# Patient Record
Sex: MALE | Race: WHITE | Employment: UNEMPLOYED | URBAN - METROPOLITAN AREA
[De-identification: names, ages, dates, MRNs, and addresses within clinical notes are randomized per-mention and may not be internally consistent; named-entity substitution may affect disease eponyms.]

---

## 2019-07-15 ENCOUNTER — APPOINTMENT (OUTPATIENT)
Dept: GENERAL RADIOLOGY | Age: 54
DRG: 351 | End: 2019-07-15
Payer: COMMERCIAL

## 2019-07-15 ENCOUNTER — HOSPITAL ENCOUNTER (INPATIENT)
Age: 54
LOS: 6 days | Discharge: NURSING, REHAB OR LONG-TERM CARE | DRG: 351 | End: 2019-07-22
Attending: EMERGENCY MEDICINE | Admitting: INTERNAL MEDICINE
Payer: COMMERCIAL

## 2019-07-15 DIAGNOSIS — T79.5XXA: Primary | ICD-10-CM

## 2019-07-15 DIAGNOSIS — T79.A21A TRAUMATIC COMPARTMENT SYNDROME OF RIGHT LOWER EXTREMITY, INITIAL ENCOUNTER (HCC): ICD-10-CM

## 2019-07-15 DIAGNOSIS — S89.91XA RIGHT LEG INJURY, INITIAL ENCOUNTER: ICD-10-CM

## 2019-07-15 LAB
ANION GAP SERPL CALCULATED.3IONS-SCNC: 14 MMOL/L (ref 3–16)
BUN BLDV-MCNC: 13 MG/DL (ref 7–20)
CALCIUM SERPL-MCNC: 9.1 MG/DL (ref 8.3–10.6)
CHLORIDE BLD-SCNC: 98 MMOL/L (ref 99–110)
CO2: 28 MMOL/L (ref 21–32)
CREAT SERPL-MCNC: 1.1 MG/DL (ref 0.9–1.3)
GFR AFRICAN AMERICAN: >60
GFR NON-AFRICAN AMERICAN: >60
GLUCOSE BLD-MCNC: 132 MG/DL (ref 70–99)
POTASSIUM REFLEX MAGNESIUM: 3.8 MMOL/L (ref 3.5–5.1)
SODIUM BLD-SCNC: 140 MMOL/L (ref 136–145)
TOTAL CK: ABNORMAL U/L (ref 39–308)

## 2019-07-15 PROCEDURE — 4500000026 HC ED CRITICAL CARE PROCEDURE

## 2019-07-15 PROCEDURE — 99291 CRITICAL CARE FIRST HOUR: CPT

## 2019-07-15 PROCEDURE — 82550 ASSAY OF CK (CPK): CPT

## 2019-07-15 PROCEDURE — 73552 X-RAY EXAM OF FEMUR 2/>: CPT

## 2019-07-15 PROCEDURE — 80048 BASIC METABOLIC PNL TOTAL CA: CPT

## 2019-07-15 PROCEDURE — 73590 X-RAY EXAM OF LOWER LEG: CPT

## 2019-07-15 RX ORDER — LIDOCAINE HYDROCHLORIDE AND EPINEPHRINE 10; 10 MG/ML; UG/ML
20 INJECTION, SOLUTION INFILTRATION; PERINEURAL ONCE
Status: COMPLETED | OUTPATIENT
Start: 2019-07-15 | End: 2019-07-16

## 2019-07-15 RX ORDER — LIDOCAINE HYDROCHLORIDE AND EPINEPHRINE 10; 10 MG/ML; UG/ML
INJECTION, SOLUTION INFILTRATION; PERINEURAL
Status: COMPLETED
Start: 2019-07-15 | End: 2019-07-16

## 2019-07-15 RX ORDER — DIVALPROEX SODIUM 125 MG/1
125 TABLET, DELAYED RELEASE ORAL 3 TIMES DAILY
COMMUNITY

## 2019-07-15 SDOH — HEALTH STABILITY: MENTAL HEALTH: HOW OFTEN DO YOU HAVE A DRINK CONTAINING ALCOHOL?: NEVER

## 2019-07-15 ASSESSMENT — PAIN SCALES - GENERAL: PAINLEVEL_OUTOF10: 10

## 2019-07-15 ASSESSMENT — PAIN DESCRIPTION - LOCATION: LOCATION: LEG

## 2019-07-15 ASSESSMENT — ENCOUNTER SYMPTOMS
SHORTNESS OF BREATH: 0
DIARRHEA: 0
ABDOMINAL PAIN: 1
VOMITING: 0
NAUSEA: 1

## 2019-07-15 ASSESSMENT — PAIN DESCRIPTION - ORIENTATION: ORIENTATION: RIGHT

## 2019-07-15 ASSESSMENT — PAIN DESCRIPTION - PAIN TYPE: TYPE: ACUTE PAIN

## 2019-07-15 NOTE — ED TRIAGE NOTES
Pt. States that he was lying in bed 2 days ago and was bitten on the back of his L upper leg, he now has blisters forming on the lower part of his leg and has some weakness in the leg.

## 2019-07-16 PROBLEM — S89.91XA RIGHT LEG INJURY, INITIAL ENCOUNTER: Status: ACTIVE | Noted: 2019-07-16

## 2019-07-16 PROBLEM — S89.92XA LEFT LEG INJURY, INITIAL ENCOUNTER: Status: ACTIVE | Noted: 2019-07-16

## 2019-07-16 LAB
A/G RATIO: 1.6 (ref 1.1–2.2)
ALBUMIN SERPL-MCNC: 3.6 G/DL (ref 3.4–5)
ALBUMIN SERPL-MCNC: 3.9 G/DL (ref 3.4–5)
ALP BLD-CCNC: 43 U/L (ref 40–129)
ALT SERPL-CCNC: 93 U/L (ref 10–40)
AMORPHOUS: ABNORMAL /HPF
AMPHETAMINE SCREEN, URINE: POSITIVE
ANION GAP SERPL CALCULATED.3IONS-SCNC: 10 MMOL/L (ref 3–16)
ANION GAP SERPL CALCULATED.3IONS-SCNC: 11 MMOL/L (ref 3–16)
AST SERPL-CCNC: 381 U/L (ref 15–37)
BACTERIA: ABNORMAL /HPF
BARBITURATE SCREEN URINE: ABNORMAL
BASOPHILS ABSOLUTE: 0 K/UL (ref 0–0.2)
BASOPHILS RELATIVE PERCENT: 0.4 %
BENZODIAZEPINE SCREEN, URINE: ABNORMAL
BILIRUB SERPL-MCNC: 0.9 MG/DL (ref 0–1)
BILIRUBIN URINE: NEGATIVE
BLOOD, URINE: ABNORMAL
BUN BLDV-MCNC: 12 MG/DL (ref 7–20)
BUN BLDV-MCNC: 13 MG/DL (ref 7–20)
CALCIUM SERPL-MCNC: 7.8 MG/DL (ref 8.3–10.6)
CALCIUM SERPL-MCNC: 8.2 MG/DL (ref 8.3–10.6)
CANNABINOID SCREEN URINE: POSITIVE
CHLORIDE BLD-SCNC: 100 MMOL/L (ref 99–110)
CHLORIDE BLD-SCNC: 104 MMOL/L (ref 99–110)
CLARITY: ABNORMAL
CO2: 26 MMOL/L (ref 21–32)
CO2: 27 MMOL/L (ref 21–32)
COCAINE METABOLITE SCREEN URINE: ABNORMAL
COLOR: YELLOW
CREAT SERPL-MCNC: 0.9 MG/DL (ref 0.9–1.3)
CREAT SERPL-MCNC: 1 MG/DL (ref 0.9–1.3)
EKG ATRIAL RATE: 70 BPM
EKG DIAGNOSIS: NORMAL
EKG P AXIS: 49 DEGREES
EKG P-R INTERVAL: 126 MS
EKG Q-T INTERVAL: 434 MS
EKG QRS DURATION: 94 MS
EKG QTC CALCULATION (BAZETT): 468 MS
EKG R AXIS: 70 DEGREES
EKG T AXIS: 70 DEGREES
EKG VENTRICULAR RATE: 70 BPM
EOSINOPHILS ABSOLUTE: 0 K/UL (ref 0–0.6)
EOSINOPHILS RELATIVE PERCENT: 0.2 %
GFR AFRICAN AMERICAN: >60
GFR AFRICAN AMERICAN: >60
GFR NON-AFRICAN AMERICAN: >60
GFR NON-AFRICAN AMERICAN: >60
GLOBULIN: 2.4 G/DL
GLUCOSE BLD-MCNC: 111 MG/DL (ref 70–99)
GLUCOSE BLD-MCNC: 155 MG/DL (ref 70–99)
GLUCOSE URINE: NEGATIVE MG/DL
HCT VFR BLD CALC: 39.4 % (ref 40.5–52.5)
HEMOGLOBIN: 13.2 G/DL (ref 13.5–17.5)
KETONES, URINE: NEGATIVE MG/DL
LEUKOCYTE ESTERASE, URINE: NEGATIVE
LV EF: 53 %
LVEF MODALITY: NORMAL
LYMPHOCYTES ABSOLUTE: 1.8 K/UL (ref 1–5.1)
LYMPHOCYTES RELATIVE PERCENT: 21.9 %
Lab: ABNORMAL
MAGNESIUM: 2 MG/DL (ref 1.8–2.4)
MCH RBC QN AUTO: 28.6 PG (ref 26–34)
MCHC RBC AUTO-ENTMCNC: 33.4 G/DL (ref 31–36)
MCV RBC AUTO: 85.8 FL (ref 80–100)
METHADONE SCREEN, URINE: ABNORMAL
MICROSCOPIC EXAMINATION: YES
MONOCYTES ABSOLUTE: 1 K/UL (ref 0–1.3)
MONOCYTES RELATIVE PERCENT: 12.6 %
NEUTROPHILS ABSOLUTE: 5.2 K/UL (ref 1.7–7.7)
NEUTROPHILS RELATIVE PERCENT: 64.9 %
NITRITE, URINE: NEGATIVE
OPIATE SCREEN URINE: ABNORMAL
OXYCODONE URINE: ABNORMAL
PDW BLD-RTO: 13.5 % (ref 12.4–15.4)
PH UA: 6
PH UA: 6 (ref 5–8)
PHENCYCLIDINE SCREEN URINE: ABNORMAL
PHOSPHORUS: 3.6 MG/DL (ref 2.5–4.9)
PLATELET # BLD: 117 K/UL (ref 135–450)
PMV BLD AUTO: 7.4 FL (ref 5–10.5)
POTASSIUM REFLEX MAGNESIUM: 3.3 MMOL/L (ref 3.5–5.1)
POTASSIUM SERPL-SCNC: 3.7 MMOL/L (ref 3.5–5.1)
PROPOXYPHENE SCREEN: ABNORMAL
PROTEIN UA: 30 MG/DL
RBC # BLD: 4.59 M/UL (ref 4.2–5.9)
RBC UA: ABNORMAL /HPF (ref 0–2)
SODIUM BLD-SCNC: 138 MMOL/L (ref 136–145)
SODIUM BLD-SCNC: 140 MMOL/L (ref 136–145)
SPECIFIC GRAVITY UA: >=1.03 (ref 1–1.03)
T4 FREE: 1.4 NG/DL (ref 0.9–1.8)
TOTAL CK: ABNORMAL U/L (ref 39–308)
TOTAL CK: ABNORMAL U/L (ref 39–308)
TOTAL PROTEIN: 6.3 G/DL (ref 6.4–8.2)
TSH REFLEX: 4.76 UIU/ML (ref 0.27–4.2)
URINE REFLEX TO CULTURE: ABNORMAL
URINE TYPE: ABNORMAL
UROBILINOGEN, URINE: 0.2 E.U./DL
WBC # BLD: 8.1 K/UL (ref 4–11)
WBC UA: ABNORMAL /HPF (ref 0–5)

## 2019-07-16 PROCEDURE — 81001 URINALYSIS AUTO W/SCOPE: CPT

## 2019-07-16 PROCEDURE — 82550 ASSAY OF CK (CPK): CPT

## 2019-07-16 PROCEDURE — 36415 COLL VENOUS BLD VENIPUNCTURE: CPT

## 2019-07-16 PROCEDURE — 2580000003 HC RX 258: Performed by: STUDENT IN AN ORGANIZED HEALTH CARE EDUCATION/TRAINING PROGRAM

## 2019-07-16 PROCEDURE — 1200000000 HC SEMI PRIVATE

## 2019-07-16 PROCEDURE — 2500000003 HC RX 250 WO HCPCS

## 2019-07-16 PROCEDURE — 93306 TTE W/DOPPLER COMPLETE: CPT

## 2019-07-16 PROCEDURE — 84439 ASSAY OF FREE THYROXINE: CPT

## 2019-07-16 PROCEDURE — 2580000003 HC RX 258: Performed by: EMERGENCY MEDICINE

## 2019-07-16 PROCEDURE — 93010 ELECTROCARDIOGRAM REPORT: CPT | Performed by: INTERNAL MEDICINE

## 2019-07-16 PROCEDURE — 96360 HYDRATION IV INFUSION INIT: CPT

## 2019-07-16 PROCEDURE — 84443 ASSAY THYROID STIM HORMONE: CPT

## 2019-07-16 PROCEDURE — 93005 ELECTROCARDIOGRAM TRACING: CPT | Performed by: STUDENT IN AN ORGANIZED HEALTH CARE EDUCATION/TRAINING PROGRAM

## 2019-07-16 PROCEDURE — 85025 COMPLETE CBC W/AUTO DIFF WBC: CPT

## 2019-07-16 PROCEDURE — 6370000000 HC RX 637 (ALT 250 FOR IP): Performed by: STUDENT IN AN ORGANIZED HEALTH CARE EDUCATION/TRAINING PROGRAM

## 2019-07-16 PROCEDURE — 6360000002 HC RX W HCPCS: Performed by: STUDENT IN AN ORGANIZED HEALTH CARE EDUCATION/TRAINING PROGRAM

## 2019-07-16 PROCEDURE — 83735 ASSAY OF MAGNESIUM: CPT

## 2019-07-16 PROCEDURE — 80307 DRUG TEST PRSMV CHEM ANLYZR: CPT

## 2019-07-16 PROCEDURE — 80053 COMPREHEN METABOLIC PANEL: CPT

## 2019-07-16 PROCEDURE — 93005 ELECTROCARDIOGRAM TRACING: CPT

## 2019-07-16 PROCEDURE — 2580000003 HC RX 258

## 2019-07-16 RX ORDER — SODIUM CHLORIDE 9 MG/ML
INJECTION, SOLUTION INTRAVENOUS
Status: COMPLETED
Start: 2019-07-16 | End: 2019-07-16

## 2019-07-16 RX ORDER — ACETAMINOPHEN 325 MG/1
650 TABLET ORAL EVERY 4 HOURS PRN
Status: DISCONTINUED | OUTPATIENT
Start: 2019-07-16 | End: 2019-07-17

## 2019-07-16 RX ORDER — ONDANSETRON 2 MG/ML
4 INJECTION INTRAMUSCULAR; INTRAVENOUS EVERY 6 HOURS PRN
Status: DISCONTINUED | OUTPATIENT
Start: 2019-07-16 | End: 2019-07-22 | Stop reason: HOSPADM

## 2019-07-16 RX ORDER — TRAMADOL HYDROCHLORIDE 50 MG/1
25 TABLET ORAL EVERY 6 HOURS PRN
Status: DISCONTINUED | OUTPATIENT
Start: 2019-07-16 | End: 2019-07-17

## 2019-07-16 RX ORDER — POTASSIUM CHLORIDE 1.5 G/1.77G
40 POWDER, FOR SOLUTION ORAL ONCE
Status: COMPLETED | OUTPATIENT
Start: 2019-07-16 | End: 2019-07-16

## 2019-07-16 RX ORDER — SODIUM CHLORIDE 0.9 % (FLUSH) 0.9 %
10 SYRINGE (ML) INJECTION EVERY 12 HOURS SCHEDULED
Status: DISCONTINUED | OUTPATIENT
Start: 2019-07-16 | End: 2019-07-22 | Stop reason: HOSPADM

## 2019-07-16 RX ORDER — 0.9 % SODIUM CHLORIDE 0.9 %
1000 INTRAVENOUS SOLUTION INTRAVENOUS ONCE
Status: COMPLETED | OUTPATIENT
Start: 2019-07-16 | End: 2019-07-16

## 2019-07-16 RX ORDER — SODIUM CHLORIDE 0.9 % (FLUSH) 0.9 %
10 SYRINGE (ML) INJECTION PRN
Status: DISCONTINUED | OUTPATIENT
Start: 2019-07-16 | End: 2019-07-22 | Stop reason: HOSPADM

## 2019-07-16 RX ORDER — DIVALPROEX SODIUM 125 MG/1
125 TABLET, DELAYED RELEASE ORAL 3 TIMES DAILY
Status: DISCONTINUED | OUTPATIENT
Start: 2019-07-16 | End: 2019-07-22 | Stop reason: HOSPADM

## 2019-07-16 RX ORDER — SODIUM CHLORIDE 9 MG/ML
INJECTION, SOLUTION INTRAVENOUS CONTINUOUS
Status: DISCONTINUED | OUTPATIENT
Start: 2019-07-16 | End: 2019-07-20

## 2019-07-16 RX ADMIN — SODIUM CHLORIDE: 9 INJECTION, SOLUTION INTRAVENOUS at 08:32

## 2019-07-16 RX ADMIN — Medication 10 ML: at 20:50

## 2019-07-16 RX ADMIN — POTASSIUM CHLORIDE 40 MEQ: 1.5 POWDER, FOR SOLUTION ORAL at 08:32

## 2019-07-16 RX ADMIN — ENOXAPARIN SODIUM 30 MG: 30 INJECTION SUBCUTANEOUS at 20:50

## 2019-07-16 RX ADMIN — DIVALPROEX SODIUM 125 MG: 125 TABLET, DELAYED RELEASE ORAL at 23:07

## 2019-07-16 RX ADMIN — LIDOCAINE HYDROCHLORIDE,EPINEPHRINE BITARTRATE 20 ML: 10; .01 INJECTION, SOLUTION INFILTRATION; PERINEURAL at 00:00

## 2019-07-16 RX ADMIN — DIVALPROEX SODIUM 125 MG: 125 TABLET, DELAYED RELEASE ORAL at 08:34

## 2019-07-16 RX ADMIN — SODIUM CHLORIDE: 9 INJECTION, SOLUTION INTRAVENOUS at 13:20

## 2019-07-16 RX ADMIN — SODIUM CHLORIDE: 9 INJECTION, SOLUTION INTRAVENOUS at 20:50

## 2019-07-16 RX ADMIN — SODIUM CHLORIDE: 9 INJECTION, SOLUTION INTRAVENOUS at 17:38

## 2019-07-16 RX ADMIN — LIDOCAINE HYDROCHLORIDE AND EPINEPHRINE 20 ML: 10; 10 INJECTION, SOLUTION INFILTRATION; PERINEURAL at 00:00

## 2019-07-16 RX ADMIN — SODIUM CHLORIDE: 9 INJECTION, SOLUTION INTRAVENOUS at 04:13

## 2019-07-16 RX ADMIN — TRAMADOL HYDROCHLORIDE 25 MG: 50 TABLET, FILM COATED ORAL at 04:14

## 2019-07-16 RX ADMIN — ENOXAPARIN SODIUM 30 MG: 30 INJECTION SUBCUTANEOUS at 08:32

## 2019-07-16 RX ADMIN — SODIUM CHLORIDE 1000 ML: 9 INJECTION, SOLUTION INTRAVENOUS at 01:38

## 2019-07-16 RX ADMIN — DIVALPROEX SODIUM 125 MG: 125 TABLET, DELAYED RELEASE ORAL at 16:55

## 2019-07-16 RX ADMIN — TRAMADOL HYDROCHLORIDE 25 MG: 50 TABLET, FILM COATED ORAL at 10:24

## 2019-07-16 RX ADMIN — TRAMADOL HYDROCHLORIDE 25 MG: 50 TABLET, FILM COATED ORAL at 16:56

## 2019-07-16 RX ADMIN — TRAMADOL HYDROCHLORIDE 25 MG: 50 TABLET, FILM COATED ORAL at 23:15

## 2019-07-16 ASSESSMENT — ENCOUNTER SYMPTOMS
RESPIRATORY NEGATIVE: 1
CHEST TIGHTNESS: 0
VOMITING: 0
ABDOMINAL DISTENTION: 0
STRIDOR: 0
COLOR CHANGE: 1
NAUSEA: 0
COUGH: 0
CONSTIPATION: 0
BACK PAIN: 0
ABDOMINAL PAIN: 1
SHORTNESS OF BREATH: 0
WHEEZING: 0
DIARRHEA: 1

## 2019-07-16 ASSESSMENT — PAIN DESCRIPTION - DESCRIPTORS
DESCRIPTORS: BURNING;ACHING
DESCRIPTORS: ACHING;THROBBING
DESCRIPTORS: ACHING

## 2019-07-16 ASSESSMENT — PAIN DESCRIPTION - PAIN TYPE
TYPE: ACUTE PAIN

## 2019-07-16 ASSESSMENT — PAIN DESCRIPTION - LOCATION
LOCATION: LEG

## 2019-07-16 ASSESSMENT — PAIN DESCRIPTION - PROGRESSION
CLINICAL_PROGRESSION: GRADUALLY WORSENING
CLINICAL_PROGRESSION: GRADUALLY WORSENING
CLINICAL_PROGRESSION: NOT CHANGED

## 2019-07-16 ASSESSMENT — PAIN - FUNCTIONAL ASSESSMENT
PAIN_FUNCTIONAL_ASSESSMENT: PREVENTS OR INTERFERES SOME ACTIVE ACTIVITIES AND ADLS

## 2019-07-16 ASSESSMENT — PAIN SCALES - GENERAL
PAINLEVEL_OUTOF10: 10
PAINLEVEL_OUTOF10: 7
PAINLEVEL_OUTOF10: 5
PAINLEVEL_OUTOF10: 7
PAINLEVEL_OUTOF10: 4
PAINLEVEL_OUTOF10: 7
PAINLEVEL_OUTOF10: 4
PAINLEVEL_OUTOF10: 10
PAINLEVEL_OUTOF10: 5
PAINLEVEL_OUTOF10: 0

## 2019-07-16 ASSESSMENT — PAIN DESCRIPTION - ORIENTATION
ORIENTATION: RIGHT

## 2019-07-16 ASSESSMENT — PAIN DESCRIPTION - ONSET
ONSET: ON-GOING

## 2019-07-16 ASSESSMENT — PAIN DESCRIPTION - FREQUENCY
FREQUENCY: CONTINUOUS

## 2019-07-16 NOTE — PROGRESS NOTES
Pt to cardio for echo via transportation.  Electronically signed by Alida Crabtree RN on 7/16/2019 at 3:17 PM

## 2019-07-16 NOTE — PROGRESS NOTES
Pt noted to be in normal sinus rhythm by this RN EKG ordered and resident notified via perfect serve. Will cont to monitor.

## 2019-07-16 NOTE — H&P
07/15/19  2206      K 3.8   CL 98*   CO2 28   BUN 13   CREATININE 1.1   GLUCOSE 132*     LFT's: No results for input(s): AST, ALT, ALB, BILITOT, ALKPHOS in the last 72 hours. Troponin: No results for input(s): TROPONINI in the last 72 hours. BNP:No results for input(s): BNP in the last 72 hours. ABGs: No results for input(s): PHART, QVY3IER, PO2ART in the last 72 hours. INR: No results for input(s): INR in the last 72 hours. U/A:No results for input(s): NITRITE, COLORU, PHUR, LABCAST, WBCUA, RBCUA, MUCUS, TRICHOMONAS, YEAST, BACTERIA, CLARITYU, SPECGRAV, LEUKOCYTESUR, UROBILINOGEN, BILIRUBINUR, BLOODU, GLUCOSEU, AMORPHOUS in the last 72 hours. Invalid input(s): KETONESU    XR TIBIA FIBULA RIGHT (2 VIEWS)   Final Result     Normal right tibia and fibula x-rays. XR FEMUR RIGHT (MIN 2 VIEWS)   Final Result     No acute findings. ASSESSMENT AND PLAN:  Mr. Barak Panda is a 49 yo m with PMHx significant for Hepatitis B, bipolar disorder, arthritis, and UGIB who presents from home complaining of R leg pain and weakness after apparent spider bite and fall. R leg injury, concern for compartment syndrome- Patient presents with R leg pain, weakness and numbness x 3 days. Patient reportedly was bit by a spider over posterior thigh and then later fell to the ground and was unable to get up for 4-6 hours. As a result, patient has several serous filled blisters across R thigh and leg with initial concern for compartment syndrome. Orthopedic surgery evaluated patient and did not think a fasciotomy was needed as patient was outside time window. Additionally, vascular surgery was consulted due to concern for vascular compromise; however distal pulses were obtained with doppler thus no intervention was needed.    - Tramadol PRN  - Vascular checks Q 4 h  - Monitor for fevers, leukocytosis   - No antibiotics needed at this time   - Social work consulted     Rhabdomyolysis- CK elevated > 34,000 on admission. Patient reportedly fell and was unable to get up and remained on the ground for 4-6 hours. Potassium and Cr WNL. Urine reportedly dark. - Aggressive IVFs @ 200mL/hour   - RFP  - UA  - Telemetry monitoring  - Trend CK   - Monitor K     New Atrial Fibrillation- Patient noted to be in A-fib on tele. Physical exam reveals irregularly irregular rhythm with normal rate. Patient denies history of A-fib. ChadsVasc score 0, will not start AC at this time.    - Tele  - EKG  - TSH  - ECHO     Bipolar disease-   - Cont home Depakote        Will discuss with attending physician Dr. Encarnacion Roads Status:Full code  FEN: General  PPX: Lovenox   DISPO: IP    Sharon Kramer MD  7/16/2019,  3:02 AM

## 2019-07-16 NOTE — PROGRESS NOTES
elevated > 34,000 on admission, downtrended to ~24K. Patient reportedly fell and was unable to get up and remained on the ground for 4-6 hours. Potassium and Cr WNL. Urine reportedly dark. - Aggressive IVFs @ 200mL/hour   - RFP  - UA  - Telemetry monitoring  - Trend CK   - Monitor K      New Atrial Fibrillation- Patient noted to be in A-fib on tele. Physical exam reveals irregularly irregular rhythm with normal rate. Patient denies history of A-fib. ChadsVasc score 0, will not start AC at this time.    - Tele  - EKG  - TSH  - ECHO      Bipolar disease-   - Cont home Depakote     Code Status: Full  FEN: general  PPX: lovenox  DISPO: Dale Rivas MD, PGY-1  07/16/19  10:48 AM    This patient has been staffed and discussed with Kelly Dietrich MD.

## 2019-07-16 NOTE — PROGRESS NOTES
Internal Medicine Resident Progress Note        PCP: No primary care provider on file. Date of Admission: 7/15/2019    Chief Complaint: R leg pain, weakness    HPI  Mr Eloise Wheeler is a 49 yo m with PMHx of Hep B, bipolar disorder, arthritis, GIB, R plant fascitis who presented to the ED with R leg pain and weakness since Saturday afternoon. Pt reports he was laying in bed when he felt something bite him on the R posterior thigh. He assumed it was a spider because he has a spider problem at home. Later that night/ into the morning he got up to go to the bathroom and fell do to weakness. He reports not knowing if he lost consciousness. Some time later he remembers crawling back to bed. A time gap is missing from his memory and he called the squad on Monday due to the pain and weakness. He endorses some \"coca-cola\" colored urine. While in ED CK >34,000 and tele identified a fib that is rate controlled. Concern for compartment syndrome led to a consult form ortho and vascular and vasc surg found a pulse with doppler. Interval History:  Patient was seen resting in the ED. Complains of R leg pain, weakness, and overall muscle soreness. Also complains of abdominal pain. When asked what happened he had some trouble figuring out exactly what day he was bit, and the time course of the events. He describes the spiders in his house as being very small and brown or black. 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 He denies spending a significant amount of time outdoors. He appears very anxious and worried. Reports he had a BM this morning that was watery and is still urinating a \"coca-cola\" colored urine. No SOB, chest pain, palpitations, N/V, F/C.        Medications:  Reviewed    Infusion Medications    sodium chloride 250 mL/hr at 07/16/19 7284     Scheduled Medications    divalproex  125 mg Oral TID    sodium chloride flush  10 mL Intravenous 2 no friction rub. No murmur heard. Irregular rhythm   Pulmonary/Chest: Effort normal and breath sounds normal. No stridor. No respiratory distress. He has no wheezes. Abdominal: Soft. Bowel sounds are normal. He exhibits no distension. There is no tenderness. There is no guarding. Musculoskeletal: He exhibits tenderness. He exhibits no edema or deformity. Limited passive ROM 2/2 pain; Moderate tenderness to palpation of thigh and calf muscles on R leg   Neurological: He is alert and oriented to person, place, and time. He displays normal reflexes. A sensory deficit is present. No cranial nerve deficit. He exhibits normal muscle tone. Coordination normal.   Perception of mild touch impaired in R leg; Pinprick preserved 3/5 strength in R ankle with dorsiflexion, plantarflexion, inversion, and eversion  5/5 strength in L  Skin: Skin is warm and dry. Capillary refill takes less than 2 seconds. Rash noted. He is not diaphoretic. There is erythema. Necrotic, deep red ulcer with fibrotic base and thin red ring noted on mid-lateral R thigh. Large, flaccid bulla noted on inside of mid-medial R thigh; Smaller bullae/vesicles noted in linear pattern on R anterotibial leg and just superior to medial malleolus   Psychiatric: He has a normal mood and affect. His behavior is normal.     Labs:   Recent Labs     07/16/19 0420   WBC 8.1   HGB 13.2*   HCT 39.4*   *     Recent Labs     07/15/19  2206 07/16/19  0420    138   K 3.8 3.3*   CL 98* 100   CO2 28 27   BUN 13 13   CREATININE 1.1 1.0   CALCIUM 9.1 8.2*     Recent Labs     07/16/19  0420   *   ALT 93*   BILITOT 0.9   ALKPHOS 43     No results for input(s): INR in the last 72 hours.   Recent Labs     07/15/19  2206 07/16/19  0420   CKTOTAL 34,599* 23,842*       Urinalysis:      Lab Results   Component Value Date    NITRU Negative 07/16/2019    WBCUA 0-2 07/16/2019    BACTERIA 4+ 07/16/2019    RBCUA 0-2 07/16/2019    BLOODU LARGE 07/16/2019

## 2019-07-16 NOTE — CONSULTS
Not on file   Lifestyle    Physical activity:     Days per week: Not on file     Minutes per session: Not on file    Stress: Not on file   Relationships    Social connections:     Talks on phone: Not on file     Gets together: Not on file     Attends Voodoo service: Not on file     Active member of club or organization: Not on file     Attends meetings of clubs or organizations: Not on file     Relationship status: Not on file    Intimate partner violence:     Fear of current or ex partner: Not on file     Emotionally abused: Not on file     Physically abused: Not on file     Forced sexual activity: Not on file   Other Topics Concern    Not on file   Social History Narrative    Not on file       Family History:   History reviewed. No pertinent family history. REVIEW OF SYSTEMS:    Patient endorses right lower extremity pain. Patient denies chest pain, shortness of breath    PHYSICAL EXAM:    General: Alert and oriented x 3. No acute distress. Normocephalic, atraumatic. Right lower extremity:   There is a 2 x 2 cm lesion at the posterolateral aspect of the mid thigh. There are numerus serous filled blisters along the posterior thigh and along the lateral aspect of the leg. There is significant tenderness to palpation at the posterior thigh, the posterior knee, and the anterolateral leg. There is a 8 x 10 cm region of erythema at the proximal aspect of the anterolateral leg which is exquisitely tender to palpation. The medial leg, and the distal leg, the ankle and the foot are non tender to palpation. He is unable to dorsiflex at the ankle, although a flicker of motion at the anterior tibial tendon is noted  There is no active function of EHL  Plantarflexion and FHL intact  Passive ROM of the ankle does cause pain posteriorly at the knee, and at the anterolateral leg. There is no pain with passive motion of the toes.    Dense numbness to the dorsum of the foot and 1st webspace  The foot feels cool,

## 2019-07-17 LAB
A/G RATIO: 1.5 (ref 1.1–2.2)
ALBUMIN SERPL-MCNC: 3.3 G/DL (ref 3.4–5)
ALP BLD-CCNC: 40 U/L (ref 40–129)
ALT SERPL-CCNC: 74 U/L (ref 10–40)
ANION GAP SERPL CALCULATED.3IONS-SCNC: 8 MMOL/L (ref 3–16)
AST SERPL-CCNC: 208 U/L (ref 15–37)
BASOPHILS ABSOLUTE: 0 K/UL (ref 0–0.2)
BASOPHILS RELATIVE PERCENT: 0.3 %
BILIRUB SERPL-MCNC: 0.5 MG/DL (ref 0–1)
BUN BLDV-MCNC: 9 MG/DL (ref 7–20)
CALCIUM SERPL-MCNC: 7.7 MG/DL (ref 8.3–10.6)
CHLORIDE BLD-SCNC: 107 MMOL/L (ref 99–110)
CO2: 26 MMOL/L (ref 21–32)
CREAT SERPL-MCNC: 0.8 MG/DL (ref 0.9–1.3)
EKG ATRIAL RATE: 67 BPM
EKG DIAGNOSIS: NORMAL
EKG P AXIS: 58 DEGREES
EKG P-R INTERVAL: 126 MS
EKG Q-T INTERVAL: 408 MS
EKG QRS DURATION: 88 MS
EKG QTC CALCULATION (BAZETT): 431 MS
EKG R AXIS: 68 DEGREES
EKG T AXIS: 73 DEGREES
EKG VENTRICULAR RATE: 67 BPM
EOSINOPHILS ABSOLUTE: 0 K/UL (ref 0–0.6)
EOSINOPHILS RELATIVE PERCENT: 0.7 %
GFR AFRICAN AMERICAN: >60
GFR NON-AFRICAN AMERICAN: >60
GLOBULIN: 2.2 G/DL
GLUCOSE BLD-MCNC: 107 MG/DL (ref 70–99)
HCT VFR BLD CALC: 34.6 % (ref 40.5–52.5)
HEMOGLOBIN: 11.6 G/DL (ref 13.5–17.5)
LYMPHOCYTES ABSOLUTE: 1.6 K/UL (ref 1–5.1)
LYMPHOCYTES RELATIVE PERCENT: 29.4 %
MCH RBC QN AUTO: 28.7 PG (ref 26–34)
MCHC RBC AUTO-ENTMCNC: 33.6 G/DL (ref 31–36)
MCV RBC AUTO: 85.5 FL (ref 80–100)
MONOCYTES ABSOLUTE: 0.6 K/UL (ref 0–1.3)
MONOCYTES RELATIVE PERCENT: 11.5 %
NEUTROPHILS ABSOLUTE: 3.2 K/UL (ref 1.7–7.7)
NEUTROPHILS RELATIVE PERCENT: 58.1 %
PDW BLD-RTO: 13.2 % (ref 12.4–15.4)
PLATELET # BLD: 93 K/UL (ref 135–450)
PMV BLD AUTO: 7.7 FL (ref 5–10.5)
POTASSIUM REFLEX MAGNESIUM: 4.1 MMOL/L (ref 3.5–5.1)
RBC # BLD: 4.05 M/UL (ref 4.2–5.9)
SODIUM BLD-SCNC: 141 MMOL/L (ref 136–145)
TOTAL CK: ABNORMAL U/L (ref 39–308)
TOTAL PROTEIN: 5.5 G/DL (ref 6.4–8.2)
WBC # BLD: 5.6 K/UL (ref 4–11)

## 2019-07-17 PROCEDURE — 97166 OT EVAL MOD COMPLEX 45 MIN: CPT

## 2019-07-17 PROCEDURE — 97530 THERAPEUTIC ACTIVITIES: CPT

## 2019-07-17 PROCEDURE — 82550 ASSAY OF CK (CPK): CPT

## 2019-07-17 PROCEDURE — 6360000002 HC RX W HCPCS: Performed by: STUDENT IN AN ORGANIZED HEALTH CARE EDUCATION/TRAINING PROGRAM

## 2019-07-17 PROCEDURE — 97116 GAIT TRAINING THERAPY: CPT

## 2019-07-17 PROCEDURE — 93010 ELECTROCARDIOGRAM REPORT: CPT | Performed by: INTERNAL MEDICINE

## 2019-07-17 PROCEDURE — 6370000000 HC RX 637 (ALT 250 FOR IP): Performed by: STUDENT IN AN ORGANIZED HEALTH CARE EDUCATION/TRAINING PROGRAM

## 2019-07-17 PROCEDURE — 2580000003 HC RX 258: Performed by: STUDENT IN AN ORGANIZED HEALTH CARE EDUCATION/TRAINING PROGRAM

## 2019-07-17 PROCEDURE — 2500000003 HC RX 250 WO HCPCS: Performed by: STUDENT IN AN ORGANIZED HEALTH CARE EDUCATION/TRAINING PROGRAM

## 2019-07-17 PROCEDURE — 99255 IP/OBS CONSLTJ NEW/EST HI 80: CPT | Performed by: INTERNAL MEDICINE

## 2019-07-17 PROCEDURE — 36415 COLL VENOUS BLD VENIPUNCTURE: CPT

## 2019-07-17 PROCEDURE — 97535 SELF CARE MNGMENT TRAINING: CPT

## 2019-07-17 PROCEDURE — 1200000000 HC SEMI PRIVATE

## 2019-07-17 PROCEDURE — 85025 COMPLETE CBC W/AUTO DIFF WBC: CPT

## 2019-07-17 PROCEDURE — 80053 COMPREHEN METABOLIC PANEL: CPT

## 2019-07-17 PROCEDURE — 97162 PT EVAL MOD COMPLEX 30 MIN: CPT

## 2019-07-17 RX ORDER — CLINDAMYCIN PHOSPHATE 300 MG/50ML
300 INJECTION INTRAVENOUS EVERY 6 HOURS
Status: DISCONTINUED | OUTPATIENT
Start: 2019-07-17 | End: 2019-07-22 | Stop reason: HOSPADM

## 2019-07-17 RX ORDER — OXYCODONE HYDROCHLORIDE AND ACETAMINOPHEN 5; 325 MG/1; MG/1
1 TABLET ORAL EVERY 6 HOURS PRN
Status: DISCONTINUED | OUTPATIENT
Start: 2019-07-17 | End: 2019-07-22 | Stop reason: HOSPADM

## 2019-07-17 RX ADMIN — DIVALPROEX SODIUM 125 MG: 125 TABLET, DELAYED RELEASE ORAL at 09:17

## 2019-07-17 RX ADMIN — SODIUM CHLORIDE: 9 INJECTION, SOLUTION INTRAVENOUS at 07:00

## 2019-07-17 RX ADMIN — CLINDAMYCIN IN 5 PERCENT DEXTROSE 300 MG: 6 INJECTION, SOLUTION INTRAVENOUS at 15:41

## 2019-07-17 RX ADMIN — OXYCODONE HYDROCHLORIDE AND ACETAMINOPHEN 1 TABLET: 5; 325 TABLET ORAL at 12:22

## 2019-07-17 RX ADMIN — SODIUM CHLORIDE: 9 INJECTION, SOLUTION INTRAVENOUS at 17:19

## 2019-07-17 RX ADMIN — OXYCODONE HYDROCHLORIDE AND ACETAMINOPHEN 1 TABLET: 5; 325 TABLET ORAL at 18:52

## 2019-07-17 RX ADMIN — CEFTRIAXONE 1 G: 1 INJECTION, POWDER, FOR SOLUTION INTRAMUSCULAR; INTRAVENOUS at 14:11

## 2019-07-17 RX ADMIN — CLINDAMYCIN IN 5 PERCENT DEXTROSE 300 MG: 6 INJECTION, SOLUTION INTRAVENOUS at 20:38

## 2019-07-17 RX ADMIN — DIVALPROEX SODIUM 125 MG: 125 TABLET, DELAYED RELEASE ORAL at 14:12

## 2019-07-17 RX ADMIN — DIVALPROEX SODIUM 125 MG: 125 TABLET, DELAYED RELEASE ORAL at 20:38

## 2019-07-17 RX ADMIN — ENOXAPARIN SODIUM 30 MG: 30 INJECTION SUBCUTANEOUS at 20:38

## 2019-07-17 RX ADMIN — TRAMADOL HYDROCHLORIDE 25 MG: 50 TABLET, FILM COATED ORAL at 07:04

## 2019-07-17 RX ADMIN — ENOXAPARIN SODIUM 30 MG: 30 INJECTION SUBCUTANEOUS at 09:17

## 2019-07-17 RX ADMIN — SODIUM CHLORIDE: 9 INJECTION, SOLUTION INTRAVENOUS at 11:11

## 2019-07-17 ASSESSMENT — PAIN SCALES - GENERAL
PAINLEVEL_OUTOF10: 0
PAINLEVEL_OUTOF10: 7
PAINLEVEL_OUTOF10: 5
PAINLEVEL_OUTOF10: 7
PAINLEVEL_OUTOF10: 5
PAINLEVEL_OUTOF10: 7
PAINLEVEL_OUTOF10: 0
PAINLEVEL_OUTOF10: 0

## 2019-07-17 ASSESSMENT — PAIN DESCRIPTION - PROGRESSION
CLINICAL_PROGRESSION: NOT CHANGED
CLINICAL_PROGRESSION: NOT CHANGED

## 2019-07-17 ASSESSMENT — PAIN DESCRIPTION - PAIN TYPE
TYPE: ACUTE PAIN

## 2019-07-17 ASSESSMENT — PAIN DESCRIPTION - ONSET
ONSET: ON-GOING
ONSET: ON-GOING

## 2019-07-17 ASSESSMENT — PAIN DESCRIPTION - DESCRIPTORS
DESCRIPTORS: SHARP
DESCRIPTORS: ACHING;SHARP

## 2019-07-17 ASSESSMENT — PAIN DESCRIPTION - ORIENTATION
ORIENTATION: RIGHT

## 2019-07-17 ASSESSMENT — PAIN DESCRIPTION - LOCATION
LOCATION: LEG

## 2019-07-17 ASSESSMENT — PAIN - FUNCTIONAL ASSESSMENT
PAIN_FUNCTIONAL_ASSESSMENT: PREVENTS OR INTERFERES SOME ACTIVE ACTIVITIES AND ADLS
PAIN_FUNCTIONAL_ASSESSMENT: PREVENTS OR INTERFERES SOME ACTIVE ACTIVITIES AND ADLS

## 2019-07-17 ASSESSMENT — PAIN DESCRIPTION - FREQUENCY
FREQUENCY: CONTINUOUS
FREQUENCY: CONTINUOUS

## 2019-07-17 NOTE — PROGRESS NOTES
(36.9 °C) Oral 69 16 99 %   07/17/19 0406 127/65 98.1 °F (36.7 °C) Oral 77 16 96 %       Intake/Output Summary (Last 24 hours) at 7/17/2019 0911  Last data filed at 7/17/2019 0810  Gross per 24 hour   Intake 7448.67 ml   Output 3875 ml   Net 3573.67 ml       Review of Systems   Constitutional: Positive for activity change. Negative for chills, diaphoresis, fatigue and fever. Respiratory: Negative for cough, chest tightness, shortness of breath, wheezing and stridor. Cardiovascular: Negative for chest pain, palpitations and leg swelling. Gastrointestinal: Positive for abdominal pain. Negative for abdominal distention, constipation, nausea and vomiting, diarrhea. Genitourinary: Negative for decreased urine volume, dysuria, frequency, hematuria and urgency. Musculoskeletal: Positive for myalgias. Negative for arthralgias, back pain, joint swelling, neck pain and neck stiffness. Skin: Positive for color change, rash and wound. Allergic/Immunologic: Negative for immunocompromised state. Neurological: Positive for weakness and numbness. Negative for dizziness, tremors, syncope, light-headedness and headaches. Psychiatric/Behavioral: Negative for agitation, behavioral problems and confusion. The patient is nervous/anxious. Physical Exam   Constitutional: He is oriented to person, place, and time. He appears well-developed and well-nourished. No distress. HENT:   Head: Normocephalic. Nose: Nose normal.   Mouth/Throat: Oropharynx is clear and moist. No oropharyngeal exudate. Bruising noted on L frontal forehead   Eyes: Pupils are equal, round, and reactive to light. Conjunctivae and EOM are normal.   Neck: Normal range of motion. Neck supple. No JVD present. Cardiovascular: Normal rate, regular rhythm, normal heart sounds and intact distal pulses. Exam reveals no gallop and no friction rub. No murmur heard. Pulmonary/Chest: Effort normal and breath sounds normal. No stridor.  No respiratory distress. He has no wheezes. Abdominal: Soft. Bowel sounds are normal. He exhibits no distension. There is no tenderness. There is no guarding. Musculoskeletal: He exhibits tenderness. He exhibits no edema or deformity. Limited passive ROM 2/2 pain; Severe tenderness to palpation of thigh and calf muscles on R leg   Neurological: He is alert and oriented to person, place, and time. He displays normal reflexes. A sensory deficit is present. No cranial nerve deficit. He exhibits normal muscle tone. Coordination normal.   Perception of mild touch impaired in R leg; Pinprick preserved; Strength on plantar- and dorsiflexion is 2/5, thigh flexion is 3/5. Can wiggle toes slightly. Skin: Skin is warm and dry. Capillary refill takes less than 2 seconds. Rash noted. He is not diaphoretic. There is erythema. Necrotic, deep red ulcer with fibrotic base and thin red ring noted on mid-lateral R thigh. Large, tense bulla noted on inside of mid-medial R thigh; Smaller bullae/vesicles noted in linear pattern on R anterotibial leg and just superior to medial malleolus   Psychiatric: He has a normal mood and affect. His behavior is normal.     LABS:    CBC:   Recent Labs     07/16/19 0420 07/17/19 0432   WBC 8.1 5.6   HGB 13.2* 11.6*   HCT 39.4* 34.6*   * 93*   MCV 85.8 85.5     Renal:    Recent Labs     07/16/19 0420 07/16/19 1741 07/17/19 0432    140 141   K 3.3* 3.7 4.1    104 107   CO2 27 26 26   BUN 13 12 9   CREATININE 1.0 0.9 0.8*   GLUCOSE 155* 111* 107*   CALCIUM 8.2* 7.8* 7.7*   MG 2.00  --   --    PHOS  --  3.6  --    ANIONGAP 11 10 8     Hepatic:   Recent Labs     07/16/19 0420 07/16/19 1741 07/17/19  0432   *  --  208*   ALT 93*  --  74*   BILITOT 0.9  --  0.5   PROT 6.3*  --  5.5*   LABALBU 3.9 3.6 3.3*   ALKPHOS 43  --  40     Troponin: No results for input(s): TROPONINI in the last 72 hours. BNP: No results for input(s): BNP in the last 72 hours.   Lipids: No results for patient and did not think a fasciotomy was needed as patient was outside time window. Additionally, vascular surgery was consulted due to concern for vascular compromise; however distal pulses were obtained with doppler thus no intervention was needed. - Tramadol PRN, consider increasing pain regimen  - Vascular checks Q 4 h  - Monitor for fevers, leukocytosis   - No antibiotics needed at this time   - Social work consulted      Rhabdomyolysis- CK elevated > 34,000 on admission, downtrended to ~12.5K 7.17. Patient reportedly fell and was unable to get up and remained on the ground for 4-6 hours. Potassium and Cr WNL. Urine reportedly dark. - Aggressive IVFs @ 250mL/hour, considering turning down in light of normal labs and urine output  - UA  - Telemetry monitoring  - Trend CK   - Monitor K      New Atrial Fibrillation- Patient noted to be in A-fib on tele, converted to sinus rhythm 7/16 EKG confirmed. Patient denies history of A-fib. ChadsVasc score 0, will not start AC at this time.    - Tele shows sinus rhythm  - TSH and free T4 wnl  - ECHO showed EF 50-55%, no structural abnormalities     Bipolar disease-   - Cont home Depakote      Code Status: Full  FEN: general  PPX: lovenox  DISPO: Daniel Martinez MD, PGY-1  07/17/19  9:11 AM    This patient has been staffed and discussed with Kathy Richter MD.

## 2019-07-17 NOTE — PROGRESS NOTES
Sit: Stand by assistance(HOB elevated)  Transfers  Sit to Stand: Minimal Assistance(from EOB, CGA from chair and toilet with v/c for hand placement)  Stand to sit: Contact guard assistance  Ambulation  Ambulation?: Yes  Ambulation 1  Device: Rolling Walker  Assistance: Minimal assistance(CGA mostly but needs min A in small spaces)  Quality of Gait: slow, effortful, demos NWB R LE due to pain, min cues for safety with walker  Distance: 2 ft, 5 ft x 2  Comments: distance limited due to pain     Balance  Sitting - Static: Good  Sitting - Dynamic: Good  Standing - Static: Fair(min A with walker while tying pants)  Standing - Dynamic: Fair(CGA to min A with RW)  Exercises  Comments: encouraged pt to demo toe wiggling and ankle pump through available ROM on R LE periodically throughout the day - pt limited by pain but states he will try   Treatment included gait and transfer training, pt education.   Plan   Plan  Times per week: 2-5  Current Treatment Recommendations: Strengthening, ROM, Transfer Training, Patient/Caregiver Education & Training, Equipment Evaluation, Education, & procurement, Balance Training, Gait Training, Functional Mobility Training, Stair training, Home Exercise Program  Safety Devices  Type of devices: Call light within reach, Chair alarm in place, Left in chair, Nurse notified, Gait belt(LEs elevated)    G-Code       OutComes Score                                                  AM-PAC Score  AM-PAC Inpatient Mobility Raw Score : 16 (07/17/19 1047)  AM-PAC Inpatient T-Scale Score : 40.78 (07/17/19 1047)  Mobility Inpatient CMS 0-100% Score: 54.16 (07/17/19 1047)  Mobility Inpatient CMS G-Code Modifier : CK (07/17/19 1047)          Goals  Short term goals  Time Frame for Short term goals: discharge  Short term goal 1: Pt will transfer sit <--> stand with supervision  Short term goal 2: Pt will amb 40 ft with walker and supervision  Short term goal 3: Pt will negotiate 3 steps with AD and CGA (if pt

## 2019-07-17 NOTE — PROGRESS NOTES
x-ray Rt femur (-)    PMH includes: arthritis, Bipolar disorder  Family / Caregiver Present: No  Referring Practitioner: Dr. Rome Castro  Diagnosis: Leg injury  Subjective  Subjective: Pt in bed upon entry. Pt agreeable to OOB activity. Patient Currently in Pain: Yes (-8/10 R AMINA, RN aware, pt was premedicated)  Social/Functional History  Social/Functional History  Lives With: Alone  Type of Home: House  Home Layout: 1/2 bath on main level, Able to Live on Main level with bedroom/bathroom(pt has 1 step inside the house to reach bedroom, 1 flight upstairs to full bath)  Home Access: Stairs to enter without rails(3 GAMAL)  Bathroom Shower/Tub: Tub/Shower unit  Bathroom Toilet: Standard  Bathroom Equipment: (none)  Home Equipment: (axillary cxs issued this admission)  ADL Assistance: Independent  Homemaking Assistance: Independent  Ambulation Assistance: Independent  Transfer Assistance: Independent  Active : No  Patient's  Info: walks to grocery store; takes bus to MD appointments  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, 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       Objective   Vision: Impaired  Vision Exceptions: Wears glasses at all times  Hearing: Within functional limits    Orientation  Overall Orientation Status: Within Functional Limits     Balance  Sitting Balance: Independent  Standing Balance: Contact guard assistance  Standing Balance  Time: ~30 sec x3  Activity: tying pants drawstring, bathroom mobility  Functional Mobility  Functional - Mobility Device: Rolling Walker  Activity: To/from bathroom  Assist Level: Minimal assistance(to CG)  Toilet Transfers  Toilet - Technique: Ambulating  Equipment Used: Standard toilet(grab bar)  Toilet Transfer: Contact guard assistance(+cues and heavy use of grab bar)  ADL  LE Dressing:  Moderate assistance(Req assist for Rt sock and to thead feet into pants )  Toileting: (denied need)  Tone RUE  RUE Tone: Normotonic  Tone LUE  LUE Tone: Normotonic  Coordination  Movements

## 2019-07-17 NOTE — PLAN OF CARE
Problem: Pain:  Goal: Control of acute pain  Description  Control of acute pain  7/17/2019 1416 by Ortencia Meigs, RN  Outcome: Ongoing   Pts pains is better controled with new pain regimen. Pt rates pain at 5/10, pt states this is tolerable. Problem: Falls - Risk of:  Goal: Will remain free from falls  Description  Will remain free from falls  7/17/2019 1416 by Ortencia Meigs, RN  Outcome: Ongoing   Pt free from injury or falls at this time, fall precautions in place, bed in low position, side rail up x2, Thomas Fall Risk: High (45 and higher), bed alarm on, reoriented to room and call light, reminded not to get up without assistance, call light in reach, will continue to monitor. Pt verbalizes understanding of fall risk procedures.    Problem: Tissue Perfusion:  Goal: Peripheral tissue perfusion will improve  Description  Peripheral tissue perfusion will improve  7/17/2019 1416 by Ortencia Meigs, RN  Outcome: Ongoing

## 2019-07-17 NOTE — CONSULTS
- Joint swelling, muscle pain  · Neurological: negative for - confusion, dizziness, headaches   · Psychiatric: No anxiety, no depression. · Dermatological: negative for - rash    Physical Examination:  Vitals:    19 1220   BP: (!) 105/52   Pulse: 76   Resp: 18   Temp: 98.8 °F (37.1 °C)   SpO2: 99%        Intake/Output Summary (Last 24 hours) at 2019 1633  Last data filed at 2019 1520  Gross per 24 hour   Intake 9364.08 ml   Output 5400 ml   Net 3964.08 ml     In: 7233.4 [P.O.:960; I.V.:6273.4]  Out: 4700    Wt Readings from Last 3 Encounters:   07/15/19 240 lb (108.9 kg)     Temp  Av.6 °F (37 °C)  Min: 98.1 °F (36.7 °C)  Max: 99.5 °F (37.5 °C)  Pulse  Av.2  Min: 69  Max: 87  BP  Min: 105/52  Max: 129/70  SpO2  Av.2 %  Min: 96 %  Max: 99 %    · Telemetry: Frequent APCs, on occasions with bigeminy pattern  · Constitutional: Alert. Oriented to person, place, and time. No distress. · Head: Normocephalic and atraumatic. · Mouth/Throat: Lips appear moist. Oropharynx is clear and moist.  · Eyes: Conjunctivae normal. EOM are normal.   · Neck: Neck supple. No lymphadenopathy. No rigidity. No JVD present. · Cardiovascular: Normal rate, regular rhythm. Normal S1&S2. Carotid pulse 2+ bilaterally. · Pulmonary/Chest: Bilateral respiratory sounds present. No respiratory accessory muscle use. No wheezes, No rhonchi. · Abdominal: Soft. Normal bowel sounds present. No distension, No tenderness. No splenomegaly. No hernia. · Musculoskeletal: No tenderness. No edema    · Lymphadenopathy: Has no cervical adenopathy. · Neurological: Alert and oriented. Cranial nerve II-XII grossly intact, No gross deficit to touch. · Skin: Skin is warm and dry. No rash, lesions, ulcerations noted. · Psychiatric: No anxiety nor agitation. Labs:  Reviewed.    Recent Labs     19  0420 19  1741 19  0432    140 141   K 3.3* 3.7 4.1    104 107   CO2 27 26 26   PHOS  --  3.6  --

## 2019-07-17 NOTE — PROGRESS NOTES
7/17/2019 0913  Last data filed at 7/17/2019 0810  Gross per 24 hour   Intake 7448.67 ml   Output 3875 ml   Net 3573.67 ml     Review of Systems   Constitutional: Positive for activity change. Negative for chills, diaphoresis, fatigue and fever. Respiratory: Negative for cough, chest tightness, shortness of breath, wheezing and stridor. Cardiovascular: Negative for chest pain, palpitations and leg swelling. Gastrointestinal: Positive for abdominal pain and diarrhea. Negative for abdominal distention, constipation, nausea and vomiting. Genitourinary: Negative for decreased urine volume, dysuria, frequency, hematuria and urgency. Musculoskeletal: Positive for myalgias. Negative for arthralgias, back pain, joint swelling, neck pain and neck stiffness. Skin: Positive for color change, rash and wound. Allergic/Immunologic: Negative for immunocompromised state. Neurological: Positive for weakness and numbness. Negative for dizziness, tremors, syncope, light-headedness and headaches. Psychiatric/Behavioral: Negative for agitation, behavioral problems and confusion. Physical Exam Performed:    BP (!) 128/108   Pulse 69   Temp 98.4 °F (36.9 °C) (Oral)   Resp 16   Ht 5' 10\" (1.778 m)   Wt 240 lb (108.9 kg)   SpO2 99%   BMI 34.44 kg/m²     General appearance: No apparent distress, appears stated age and cooperative. HEENT: Pupils equal, round, and reactive to light. Conjunctivae/corneas clear. Bruising noted on L forehead  Neck: Supple, with full range of motion. No jugular venous distention. Trachea midline. Respiratory:  Normal respiratory effort. Clear to auscultation, bilaterally without Rales/Wheezes/Rhonchi. Cardiovascular: Regular rate and rhythm with normal S1/S2 without murmurs, rubs or gallops. Abdomen: Soft, non-tender, non-distended with normal bowel sounds. Musculoskeletal: No clubbing, cyanosis or edema bilaterally. Limited passive ROM 2/2 pain;  Moderate tenderness to palpation of thigh and calf muscles on R leg   Neurological: He is alert and oriented to person, place, and time. He displays normal reflexes. A sensory deficit is present. No cranial nerve deficit. He exhibits normal muscle tone. Coordination normal. Weakness noted on R leg compared to left 3/5  Perception of mild touch impaired in R leg; Pinprick preserved   Skin: Skin is warm and dry. Capillary refill takes less than 2 seconds. Rash noted. He is not diaphoretic. There is erythema. Necrotic, deep red ulcer with fibrotic base and thin red ring noted on mid-lateral R thigh. Large, flaccid bulla noted on inside of mid-medial R thigh; Smaller bullae/vesicles noted in linear pattern on R anterotibial leg and just superior to medial malleolus   Psychiatric: He has a normal mood and affect. His behavior is normal      Labs:   Recent Labs     07/16/19  0420 07/17/19  0432   WBC 8.1 5.6   HGB 13.2* 11.6*   HCT 39.4* 34.6*   * 93*     Recent Labs     07/16/19  0420 07/16/19  1741 07/17/19  0432    140 141   K 3.3* 3.7 4.1    104 107   CO2 27 26 26   BUN 13 12 9   CREATININE 1.0 0.9 0.8*   CALCIUM 8.2* 7.8* 7.7*   PHOS  --  3.6  --      Recent Labs     07/16/19  0420 07/17/19  0432   * 208*   ALT 93* 74*   BILITOT 0.9 0.5   ALKPHOS 43 40     No results for input(s): INR in the last 72 hours. Recent Labs     07/16/19  0420 07/16/19  1024 07/17/19  0432   CKTOTAL 23,842* 20,398* 12,531*       Urinalysis:      Lab Results   Component Value Date    NITRU Negative 07/16/2019    WBCUA 0-2 07/16/2019    BACTERIA 4+ 07/16/2019    RBCUA 0-2 07/16/2019    BLOODU LARGE 07/16/2019    SPECGRAV >=1.030 07/16/2019    GLUCOSEU Negative 07/16/2019       Radiology:  XR TIBIA FIBULA RIGHT (2 VIEWS)   Final Result     Normal right tibia and fibula x-rays. XR FEMUR RIGHT (MIN 2 VIEWS)   Final Result     No acute findings.                   Assessment/Plan:    Mr. Petersen Him is a stable 47 yo m with PMHx significant for Hepatitis B, bipolar disorder, arthritis, and UGIB who presents from home complaining of R leg pain and weakness since Saturday afternoon. He reports being bitten by a spider, and was down for 4-6 hours due to leg weakness before presenting to the ED a day later. CK found to be >34K, has since downtrended to >12K. Rhabdomyolysis treated with aggressive IVF. ED was worried about compartment syndrome, vascular and ortho both consulted, pulses found, but outside of fasciotomy window anyway. Afib found on EKG, rate-controlled. .     R leg injury, concern for compartment syndrome- Patient presents with R leg pain, weakness and numbness x 3 days. Patient reportedly was bit by a spider over posterior thigh and then later fell to the ground and was unable to get up for 4-6 hours. As a result, patient has several serous filled blisters across R thigh and leg with initial concern for compartment syndrome. Orthopedic surgery evaluated patient and did not think a fasciotomy was needed as patient was outside time window. Additionally, vascular surgery was consulted due to concern for vascular compromise; however distal pulses were obtained with doppler thus no intervention was needed. Weakness on decreased strength noted in R lower extremity, improving since yesterday. - Tramadol PRN  - Vascular checks Q 4 h  - Monitor for fevers, leukocytosis   - No antibiotics needed at this time   - Social work consulted      Rhabdomyolysis- CK elevated > 34,000 on admission, downtrended to ~12K. Patient reportedly fell and was unable to get up and remained on the ground for 4-6 hours. Potassium and Cr WNL. Urine reportedly dark on admission but has since resolved. - Aggressive IVFs @ 250mL/hour   - RFP  - UA  - Telemetry monitoring: nothing of note overnight  - Trend CK 34,000 on admission down to 24K yesterday evening, 12K today  - Monitor K: most recent 4.1     New Atrial Fibrillation- Patient noted to be in A-fib on tele. Physical exam reveals irregularly irregular rhythm with normal rate. Patient denies history of A-fib. ChadsVasc score 0, will not start AC at this time.    - Tele: nothing of note overnight  - EKG: no acute changes  - TSH: 4.7  - ECHO: normal, 50-55%     Bipolar disease-   - Cont home Depakote     DVT Prophylaxis: ***  Diet: DIET GENERAL;  Code Status: Full Code    PT/OT Eval Status: ***    Dispo - ***    Apoorva Mesa  9:13 AM  [unfilled]     will discuss the patient with the senior resident and Bobbi Ahumada, MD

## 2019-07-17 NOTE — PROGRESS NOTES
[facility] Nurse  Follow-up Progress Note       NAME:  Michaela Avila  MEDICAL RECORD NUMBER:  [id number]  AGE:  48 y.o. GENDER:  male  :  1965  TODAY'S DATE:  2019    Subjective:   Wound Identification: RLE and thigh, LLE   Wound Type: trauma  Contributing Factors: fall at home/laid on leg states, appears as if garment twisted on leg with shearing maroon marks and blister streaks, edema, pain to touch        Patient Goal of Care:  [x] Wound Healing  [] Odor Control  [x] Palliative Care  [] Pain Control   [] Other:     Objective:    BP (!) 105/52   Pulse 76   Temp 98.8 °F (37.1 °C) (Oral)   Resp 18   Ht 5' 10\" (1.778 m)   Wt 240 lb (108.9 kg)   SpO2 99%   BMI 34.44 kg/m²   Kash Risk Score: Kash Scale Score: 21  Assessment:   Measurements:  Wound 19 Thigh Right;Medial large bulla-drained/scattered dark maroon lines  Trauma (Active)   Wound Traumatic 2019  4:15 PM   Dressing/Treatment Other (comment);ABD; Ace Wrap 2019  4:15 PM   Wound Cleansed Rinsed/Irrigated with saline 2019  4:15 PM   Wound Length (cm) 3.5 cm 2019  4:15 PM   Wound Width (cm) 2.5 cm 2019  4:15 PM   Wound Depth (cm) 0.2 cm 2019  4:15 PM   Wound Surface Area (cm^2) 8.75 cm^2 2019  4:15 PM   Wound Volume (cm^3) 1.75 cm^3 2019  4:15 PM   Wound Assessment Pink 2019  4:15 PM   Drainage Amount Large 2019  4:15 PM   Drainage Description Serous 2019  4:15 PM   Odor None 2019  4:15 PM   Margins Attached edges 2019  4:15 PM   Andie-wound Assessment Other (Comment) 2019  4:15 PM   Number of days: 0       Wound 19 Thigh Right;Lateral partial thick slin tear  Trauma (Active)   Wound Traumatic 2019  4:15 PM   Dressing/Treatment ABD; Ace Wrap;Xeroform 2019  4:15 PM   Wound Length (cm) 1.5 cm 2019  4:15 PM   Wound Width (cm) 1 cm 2019  4:15 PM   Wound Depth (cm) 0.1 cm 2019  4:15 PM   Wound

## 2019-07-18 LAB
A/G RATIO: 1.4 (ref 1.1–2.2)
ALBUMIN SERPL-MCNC: 3.7 G/DL (ref 3.4–5)
ALP BLD-CCNC: 57 U/L (ref 40–129)
ALT SERPL-CCNC: 79 U/L (ref 10–40)
ANION GAP SERPL CALCULATED.3IONS-SCNC: 9 MMOL/L (ref 3–16)
AST SERPL-CCNC: 180 U/L (ref 15–37)
BASOPHILS ABSOLUTE: 0 K/UL (ref 0–0.2)
BASOPHILS RELATIVE PERCENT: 0.3 %
BILIRUB SERPL-MCNC: 0.4 MG/DL (ref 0–1)
BUN BLDV-MCNC: 11 MG/DL (ref 7–20)
CALCIUM SERPL-MCNC: 8.8 MG/DL (ref 8.3–10.6)
CHLORIDE BLD-SCNC: 104 MMOL/L (ref 99–110)
CO2: 30 MMOL/L (ref 21–32)
CREAT SERPL-MCNC: 0.9 MG/DL (ref 0.9–1.3)
EOSINOPHILS ABSOLUTE: 0.1 K/UL (ref 0–0.6)
EOSINOPHILS RELATIVE PERCENT: 1.6 %
GFR AFRICAN AMERICAN: >60
GFR NON-AFRICAN AMERICAN: >60
GLOBULIN: 2.7 G/DL
GLUCOSE BLD-MCNC: 105 MG/DL (ref 70–99)
HCT VFR BLD CALC: 39 % (ref 40.5–52.5)
HEMOGLOBIN: 13 G/DL (ref 13.5–17.5)
LYMPHOCYTES ABSOLUTE: 1.5 K/UL (ref 1–5.1)
LYMPHOCYTES RELATIVE PERCENT: 25.3 %
MCH RBC QN AUTO: 28.9 PG (ref 26–34)
MCHC RBC AUTO-ENTMCNC: 33.4 G/DL (ref 31–36)
MCV RBC AUTO: 86.6 FL (ref 80–100)
MONOCYTES ABSOLUTE: 0.6 K/UL (ref 0–1.3)
MONOCYTES RELATIVE PERCENT: 9.7 %
NEUTROPHILS ABSOLUTE: 3.6 K/UL (ref 1.7–7.7)
NEUTROPHILS RELATIVE PERCENT: 63.1 %
PDW BLD-RTO: 13.3 % (ref 12.4–15.4)
PLATELET # BLD: 106 K/UL (ref 135–450)
PMV BLD AUTO: 8.3 FL (ref 5–10.5)
POTASSIUM REFLEX MAGNESIUM: 5.8 MMOL/L (ref 3.5–5.1)
POTASSIUM SERPL-SCNC: 4.5 MMOL/L (ref 3.5–5.1)
RBC # BLD: 4.5 M/UL (ref 4.2–5.9)
SODIUM BLD-SCNC: 143 MMOL/L (ref 136–145)
TOTAL CK: 9457 U/L (ref 39–308)
TOTAL PROTEIN: 6.4 G/DL (ref 6.4–8.2)
WBC # BLD: 5.7 K/UL (ref 4–11)

## 2019-07-18 PROCEDURE — 1200000000 HC SEMI PRIVATE

## 2019-07-18 PROCEDURE — 2580000003 HC RX 258: Performed by: STUDENT IN AN ORGANIZED HEALTH CARE EDUCATION/TRAINING PROGRAM

## 2019-07-18 PROCEDURE — 82550 ASSAY OF CK (CPK): CPT

## 2019-07-18 PROCEDURE — 2580000003 HC RX 258: Performed by: INTERNAL MEDICINE

## 2019-07-18 PROCEDURE — 85025 COMPLETE CBC W/AUTO DIFF WBC: CPT

## 2019-07-18 PROCEDURE — 97530 THERAPEUTIC ACTIVITIES: CPT

## 2019-07-18 PROCEDURE — 36415 COLL VENOUS BLD VENIPUNCTURE: CPT

## 2019-07-18 PROCEDURE — 6370000000 HC RX 637 (ALT 250 FOR IP): Performed by: STUDENT IN AN ORGANIZED HEALTH CARE EDUCATION/TRAINING PROGRAM

## 2019-07-18 PROCEDURE — 6360000002 HC RX W HCPCS: Performed by: STUDENT IN AN ORGANIZED HEALTH CARE EDUCATION/TRAINING PROGRAM

## 2019-07-18 PROCEDURE — 80053 COMPREHEN METABOLIC PANEL: CPT

## 2019-07-18 PROCEDURE — 2500000003 HC RX 250 WO HCPCS: Performed by: STUDENT IN AN ORGANIZED HEALTH CARE EDUCATION/TRAINING PROGRAM

## 2019-07-18 PROCEDURE — 99232 SBSQ HOSP IP/OBS MODERATE 35: CPT | Performed by: NURSE PRACTITIONER

## 2019-07-18 PROCEDURE — 84132 ASSAY OF SERUM POTASSIUM: CPT

## 2019-07-18 PROCEDURE — 97116 GAIT TRAINING THERAPY: CPT

## 2019-07-18 RX ADMIN — ENOXAPARIN SODIUM 30 MG: 30 INJECTION SUBCUTANEOUS at 21:05

## 2019-07-18 RX ADMIN — CLINDAMYCIN IN 5 PERCENT DEXTROSE 300 MG: 6 INJECTION, SOLUTION INTRAVENOUS at 16:16

## 2019-07-18 RX ADMIN — OXYCODONE HYDROCHLORIDE AND ACETAMINOPHEN 1 TABLET: 5; 325 TABLET ORAL at 04:36

## 2019-07-18 RX ADMIN — CEFTRIAXONE 1 G: 1 INJECTION, POWDER, FOR SOLUTION INTRAMUSCULAR; INTRAVENOUS at 14:23

## 2019-07-18 RX ADMIN — OXYCODONE HYDROCHLORIDE AND ACETAMINOPHEN 1 TABLET: 5; 325 TABLET ORAL at 17:46

## 2019-07-18 RX ADMIN — ENOXAPARIN SODIUM 30 MG: 30 INJECTION SUBCUTANEOUS at 09:38

## 2019-07-18 RX ADMIN — DIVALPROEX SODIUM 125 MG: 125 TABLET, DELAYED RELEASE ORAL at 09:38

## 2019-07-18 RX ADMIN — CLINDAMYCIN IN 5 PERCENT DEXTROSE 300 MG: 6 INJECTION, SOLUTION INTRAVENOUS at 09:38

## 2019-07-18 RX ADMIN — OXYCODONE HYDROCHLORIDE AND ACETAMINOPHEN 1 TABLET: 5; 325 TABLET ORAL at 10:46

## 2019-07-18 RX ADMIN — DIVALPROEX SODIUM 125 MG: 125 TABLET, DELAYED RELEASE ORAL at 21:05

## 2019-07-18 RX ADMIN — CLINDAMYCIN IN 5 PERCENT DEXTROSE 300 MG: 6 INJECTION, SOLUTION INTRAVENOUS at 02:45

## 2019-07-18 RX ADMIN — DIVALPROEX SODIUM 125 MG: 125 TABLET, DELAYED RELEASE ORAL at 14:23

## 2019-07-18 RX ADMIN — CLINDAMYCIN IN 5 PERCENT DEXTROSE 300 MG: 6 INJECTION, SOLUTION INTRAVENOUS at 21:05

## 2019-07-18 RX ADMIN — SODIUM CHLORIDE: 9 INJECTION, SOLUTION INTRAVENOUS at 19:25

## 2019-07-18 RX ADMIN — SODIUM CHLORIDE: 9 INJECTION, SOLUTION INTRAVENOUS at 12:09

## 2019-07-18 ASSESSMENT — PAIN DESCRIPTION - LOCATION
LOCATION: LEG

## 2019-07-18 ASSESSMENT — PAIN DESCRIPTION - ONSET
ONSET: GRADUAL
ONSET: ON-GOING
ONSET: GRADUAL

## 2019-07-18 ASSESSMENT — PAIN DESCRIPTION - FREQUENCY
FREQUENCY: INTERMITTENT

## 2019-07-18 ASSESSMENT — PAIN DESCRIPTION - ORIENTATION
ORIENTATION: RIGHT

## 2019-07-18 ASSESSMENT — PAIN SCALES - GENERAL
PAINLEVEL_OUTOF10: 8
PAINLEVEL_OUTOF10: 0
PAINLEVEL_OUTOF10: 0
PAINLEVEL_OUTOF10: 8
PAINLEVEL_OUTOF10: 0
PAINLEVEL_OUTOF10: 0
PAINLEVEL_OUTOF10: 6
PAINLEVEL_OUTOF10: 7
PAINLEVEL_OUTOF10: 6

## 2019-07-18 ASSESSMENT — PAIN DESCRIPTION - DESCRIPTORS
DESCRIPTORS: ACHING;SHARP
DESCRIPTORS: ACHING
DESCRIPTORS: ACHING;SHARP
DESCRIPTORS: ACHING
DESCRIPTORS: ACHING;SHARP

## 2019-07-18 ASSESSMENT — PAIN DESCRIPTION - PROGRESSION
CLINICAL_PROGRESSION: GRADUALLY WORSENING
CLINICAL_PROGRESSION: GRADUALLY WORSENING
CLINICAL_PROGRESSION: GRADUALLY IMPROVING
CLINICAL_PROGRESSION: NOT CHANGED
CLINICAL_PROGRESSION: GRADUALLY WORSENING

## 2019-07-18 ASSESSMENT — PAIN DESCRIPTION - PAIN TYPE
TYPE: ACUTE PAIN

## 2019-07-18 NOTE — PROGRESS NOTES
Additionally, vascular surgery was consulted due to concern for vascular compromise; however distal pulses were obtained with doppler thus no intervention was needed. - Percocet q6h PRN  - Vascular checks Q 4 h  - Monitor for fevers, leukocytosis   - Started on clindamycin and ceftriaxone 7/17   - Social work consulted      Rhabdomyolysis- CK elevated > 34,000 on admission, downtrended to ~9K 7. 18. Patient reportedly fell and was unable to get up and remained on the ground for 4-6 hours. Potassium and Cr WNL. Urine reportedly dark. - IVFs @ 100mL/hour  - UA  - Telemetry monitoring  - Trend CK   - Monitor K      Sinus arrhythmia- Patient noted to have sinus arrhythmia on tele, converted to sinus rhythm 7/16 EKG confirmed. Patient denies history of A-fib. ChadsVasc score 0, will not start AC at this time.    - Tele shows sinus rhythm  - TSH and free T4 wnl  - ECHO showed EF 50-55%, no structural abnormalities     Bipolar disease   - Cont home Depakote      Code Status: Full  FEN: general  PPX: lovenox  Peter Bermeo MD, PGY-1  07/18/19  6:55 AM    This patient has been staffed and discussed with Christina Burgess MD.

## 2019-07-18 NOTE — CARE COORDINATION
Case Management Assessment           Daily Note                 Date/ Time of Note: 7/18/2019 3:10 PM         Note completed by: Chica Leung    Patient Name: Randy Cosby  YOB: 1965    Diagnosis:Left leg injury, initial encounter [S89.92XA]  Left leg injury, initial encounter [S89.92XA]  Right leg injury, initial encounter [S89.91XA]  Right leg injury, initial encounter [S89.91XA]  Patient Admission Status: Inpatient    Date of Admission:7/15/2019  6:35 PM Length of Stay: 2 GLOS: GMLOS: 3.2      Current Plan of Care: awaiting medical stability, awaiting pre-cert for SNF  ________________________________________________________________________________________  PT AM-PAC: 17 / 24 per last evaluation on: 7.17.2019    OT AM-PAC: 19 / 24 per last evaluation on: 7.17.2019    DME Needs for discharge: walker    ________________________________________________________________________________________  Discharge Plan: SNF: [facility]    Tentative discharge date: 7.19.2019    Current barriers to discharge: pre-cert pending for SNF; started 7.18.2019 once accepted at [facility]    Referrals completed: Not Applicable    Resources/ information provided: Not indicated at this time  ________________________________________________________________________________________  Case Management Notes: CM continues to follow for discharge planning, patient has been accepted to SNF at [facility]. CM spoke with Sandra, she reports that they are able to accept the patient and she has started pre-cert today. CM has completed HENS online through secure website for SNF admission at [facility]. HENS document ID #: [id number]      Jose Raul Kearney and his family were provided with choice of provider; he and his family are in agreement with the discharge plan.     Care Transition Patient: Prema Leung RN  [facility]  Case Management Department  Ph: [phone]  Fax: [phone]

## 2019-07-19 PROBLEM — T63.301A SPIDER BITE: Status: ACTIVE | Noted: 2019-07-19

## 2019-07-19 PROBLEM — M62.82 RHABDOMYOLYSIS: Status: ACTIVE | Noted: 2019-07-19

## 2019-07-19 PROBLEM — T79.A0XA COMPARTMENT SYNDROME (HCC): Status: ACTIVE | Noted: 2019-07-19

## 2019-07-19 PROBLEM — L03.90 CELLULITIS: Status: ACTIVE | Noted: 2019-07-19

## 2019-07-19 LAB
A/G RATIO: 1.4 (ref 1.1–2.2)
ALBUMIN SERPL-MCNC: 3.5 G/DL (ref 3.4–5)
ALP BLD-CCNC: 56 U/L (ref 40–129)
ALT SERPL-CCNC: 60 U/L (ref 10–40)
ANION GAP SERPL CALCULATED.3IONS-SCNC: 9 MMOL/L (ref 3–16)
AST SERPL-CCNC: 107 U/L (ref 15–37)
BASOPHILS ABSOLUTE: 0 K/UL (ref 0–0.2)
BASOPHILS RELATIVE PERCENT: 0.2 %
BILIRUB SERPL-MCNC: 0.4 MG/DL (ref 0–1)
BUN BLDV-MCNC: 10 MG/DL (ref 7–20)
CALCIUM SERPL-MCNC: 8.4 MG/DL (ref 8.3–10.6)
CHLORIDE BLD-SCNC: 102 MMOL/L (ref 99–110)
CO2: 29 MMOL/L (ref 21–32)
CREAT SERPL-MCNC: 0.8 MG/DL (ref 0.9–1.3)
EOSINOPHILS ABSOLUTE: 0.1 K/UL (ref 0–0.6)
EOSINOPHILS RELATIVE PERCENT: 2.2 %
GFR AFRICAN AMERICAN: >60
GFR NON-AFRICAN AMERICAN: >60
GLOBULIN: 2.5 G/DL
GLUCOSE BLD-MCNC: 99 MG/DL (ref 70–99)
HCT VFR BLD CALC: 38 % (ref 40.5–52.5)
HEMOGLOBIN: 12.6 G/DL (ref 13.5–17.5)
LYMPHOCYTES ABSOLUTE: 1.1 K/UL (ref 1–5.1)
LYMPHOCYTES RELATIVE PERCENT: 21.8 %
MCH RBC QN AUTO: 28.4 PG (ref 26–34)
MCHC RBC AUTO-ENTMCNC: 33 G/DL (ref 31–36)
MCV RBC AUTO: 85.9 FL (ref 80–100)
MONOCYTES ABSOLUTE: 0.5 K/UL (ref 0–1.3)
MONOCYTES RELATIVE PERCENT: 8.9 %
NEUTROPHILS ABSOLUTE: 3.5 K/UL (ref 1.7–7.7)
NEUTROPHILS RELATIVE PERCENT: 66.9 %
PDW BLD-RTO: 13 % (ref 12.4–15.4)
PLATELET # BLD: 116 K/UL (ref 135–450)
PMV BLD AUTO: 8.1 FL (ref 5–10.5)
POTASSIUM REFLEX MAGNESIUM: 4.8 MMOL/L (ref 3.5–5.1)
RBC # BLD: 4.43 M/UL (ref 4.2–5.9)
SODIUM BLD-SCNC: 140 MMOL/L (ref 136–145)
TOTAL CK: 4467 U/L (ref 39–308)
TOTAL PROTEIN: 6 G/DL (ref 6.4–8.2)
WBC # BLD: 5.3 K/UL (ref 4–11)

## 2019-07-19 PROCEDURE — 6360000002 HC RX W HCPCS: Performed by: STUDENT IN AN ORGANIZED HEALTH CARE EDUCATION/TRAINING PROGRAM

## 2019-07-19 PROCEDURE — 36415 COLL VENOUS BLD VENIPUNCTURE: CPT

## 2019-07-19 PROCEDURE — 1200000000 HC SEMI PRIVATE

## 2019-07-19 PROCEDURE — 2500000003 HC RX 250 WO HCPCS: Performed by: STUDENT IN AN ORGANIZED HEALTH CARE EDUCATION/TRAINING PROGRAM

## 2019-07-19 PROCEDURE — 85025 COMPLETE CBC W/AUTO DIFF WBC: CPT

## 2019-07-19 PROCEDURE — 2580000003 HC RX 258: Performed by: STUDENT IN AN ORGANIZED HEALTH CARE EDUCATION/TRAINING PROGRAM

## 2019-07-19 PROCEDURE — 99232 SBSQ HOSP IP/OBS MODERATE 35: CPT | Performed by: NURSE PRACTITIONER

## 2019-07-19 PROCEDURE — 80053 COMPREHEN METABOLIC PANEL: CPT

## 2019-07-19 PROCEDURE — 6370000000 HC RX 637 (ALT 250 FOR IP): Performed by: STUDENT IN AN ORGANIZED HEALTH CARE EDUCATION/TRAINING PROGRAM

## 2019-07-19 PROCEDURE — 82550 ASSAY OF CK (CPK): CPT

## 2019-07-19 PROCEDURE — 2580000003 HC RX 258: Performed by: INTERNAL MEDICINE

## 2019-07-19 RX ORDER — OXYCODONE HYDROCHLORIDE AND ACETAMINOPHEN 5; 325 MG/1; MG/1
1 TABLET ORAL EVERY 6 HOURS PRN
Qty: 12 TABLET | Refills: 0 | Status: SHIPPED | OUTPATIENT
Start: 2019-07-19 | End: 2019-07-22

## 2019-07-19 RX ORDER — GREEN TEA/HOODIA GORDONII 315-12.5MG
1 CAPSULE ORAL 2 TIMES DAILY
Qty: 60 TABLET | Refills: 0 | Status: SHIPPED | OUTPATIENT
Start: 2019-07-19 | End: 2019-08-18

## 2019-07-19 RX ORDER — CLINDAMYCIN HYDROCHLORIDE 300 MG/1
600 CAPSULE ORAL 2 TIMES DAILY
Qty: 24 CAPSULE | Refills: 0 | Status: SHIPPED | OUTPATIENT
Start: 2019-07-19 | End: 2019-07-25

## 2019-07-19 RX ADMIN — DIVALPROEX SODIUM 125 MG: 125 TABLET, DELAYED RELEASE ORAL at 22:37

## 2019-07-19 RX ADMIN — CLINDAMYCIN IN 5 PERCENT DEXTROSE 300 MG: 6 INJECTION, SOLUTION INTRAVENOUS at 08:47

## 2019-07-19 RX ADMIN — DIVALPROEX SODIUM 125 MG: 125 TABLET, DELAYED RELEASE ORAL at 13:39

## 2019-07-19 RX ADMIN — SODIUM CHLORIDE: 9 INJECTION, SOLUTION INTRAVENOUS at 15:49

## 2019-07-19 RX ADMIN — CLINDAMYCIN IN 5 PERCENT DEXTROSE 300 MG: 6 INJECTION, SOLUTION INTRAVENOUS at 22:36

## 2019-07-19 RX ADMIN — SODIUM CHLORIDE: 9 INJECTION, SOLUTION INTRAVENOUS at 22:36

## 2019-07-19 RX ADMIN — CLINDAMYCIN IN 5 PERCENT DEXTROSE 300 MG: 6 INJECTION, SOLUTION INTRAVENOUS at 03:28

## 2019-07-19 RX ADMIN — DIVALPROEX SODIUM 125 MG: 125 TABLET, DELAYED RELEASE ORAL at 08:47

## 2019-07-19 RX ADMIN — CEFTRIAXONE 1 G: 1 INJECTION, POWDER, FOR SOLUTION INTRAMUSCULAR; INTRAVENOUS at 13:39

## 2019-07-19 RX ADMIN — ENOXAPARIN SODIUM 30 MG: 30 INJECTION SUBCUTANEOUS at 22:36

## 2019-07-19 RX ADMIN — SODIUM CHLORIDE: 9 INJECTION, SOLUTION INTRAVENOUS at 08:47

## 2019-07-19 RX ADMIN — OXYCODONE HYDROCHLORIDE AND ACETAMINOPHEN 1 TABLET: 5; 325 TABLET ORAL at 00:30

## 2019-07-19 RX ADMIN — CLINDAMYCIN IN 5 PERCENT DEXTROSE 300 MG: 6 INJECTION, SOLUTION INTRAVENOUS at 15:34

## 2019-07-19 RX ADMIN — OXYCODONE HYDROCHLORIDE AND ACETAMINOPHEN 1 TABLET: 5; 325 TABLET ORAL at 09:08

## 2019-07-19 RX ADMIN — ENOXAPARIN SODIUM 30 MG: 30 INJECTION SUBCUTANEOUS at 08:46

## 2019-07-19 RX ADMIN — OXYCODONE HYDROCHLORIDE AND ACETAMINOPHEN 1 TABLET: 5; 325 TABLET ORAL at 15:34

## 2019-07-19 ASSESSMENT — PAIN DESCRIPTION - PAIN TYPE
TYPE: ACUTE PAIN

## 2019-07-19 ASSESSMENT — PAIN - FUNCTIONAL ASSESSMENT
PAIN_FUNCTIONAL_ASSESSMENT: PREVENTS OR INTERFERES SOME ACTIVE ACTIVITIES AND ADLS

## 2019-07-19 ASSESSMENT — PAIN DESCRIPTION - ORIENTATION
ORIENTATION: RIGHT

## 2019-07-19 ASSESSMENT — PAIN DESCRIPTION - FREQUENCY
FREQUENCY: INTERMITTENT

## 2019-07-19 ASSESSMENT — PAIN DESCRIPTION - DESCRIPTORS
DESCRIPTORS: ACHING

## 2019-07-19 ASSESSMENT — PAIN SCALES - GENERAL
PAINLEVEL_OUTOF10: 5
PAINLEVEL_OUTOF10: 0
PAINLEVEL_OUTOF10: 7
PAINLEVEL_OUTOF10: 0
PAINLEVEL_OUTOF10: 0
PAINLEVEL_OUTOF10: 7
PAINLEVEL_OUTOF10: 0

## 2019-07-19 ASSESSMENT — PAIN DESCRIPTION - PROGRESSION
CLINICAL_PROGRESSION: GRADUALLY WORSENING

## 2019-07-19 ASSESSMENT — PAIN DESCRIPTION - ONSET
ONSET: GRADUAL
ONSET: ON-GOING
ONSET: ON-GOING

## 2019-07-19 ASSESSMENT — PAIN DESCRIPTION - LOCATION
LOCATION: LEG

## 2019-07-19 NOTE — PROGRESS NOTES
input(s): TROPONINI in the last 72 hours. BNP: No results for input(s): BNP in the last 72 hours. Lipids: No results for input(s): CHOL, HDL in the last 72 hours. Invalid input(s): LDLCALCU, TRIGLYCERIDE  ABGs:  No results for input(s): PHART, CVK7CEJ, PO2ART, JDY4UYA, BEART, THGBART, K0HTXBAU, LGD9SSN in the last 72 hours. INR: No results for input(s): INR in the last 72 hours. Lactate: No results for input(s): LACTATE in the last 72 hours. Cultures:  -----------------------------------------------------------------  RAD:   XR TIBIA FIBULA RIGHT (2 VIEWS)   Final Result     Normal right tibia and fibula x-rays. XR FEMUR RIGHT (MIN 2 VIEWS)   Final Result     No acute findings. Assessment/Plan:     Mr Mathieu Finley is a 49 yo m with PMHx of Hep B, bipolar disorder, arthritis, GIB, R plant fascitis who presented to the ED with R leg pain and weakness since Saturday afternoon.  Pt endorsed spider bite and fall at home, was on ground for hours.  While in ED CK >34,000 and tele identified sinus arrhythmia.  Concern for compartment syndrome led to a consult from ortho and vascular and vasc surg, found a pulse with doppler.  Fasciotomy excluded from treatment options.  Since then, aggressive IVF utilized to treat rhabdomyolysis, CK trended down. Antibiotics started (clinda and ceftriaxone) for concern for cellulitis around spider bite. Cardiology saw for arrhythmia, want to follow up outpt at a later date for a Holter monitor. Missed compartment syndrome - Patient presents with R leg pain, weakness and numbness x 3 days. Patient reportedly was bit by a spider over posterior thigh and then later fell to the ground and was unable to get up for 4-6 hours. As a result, patient has several serous filled blisters across R thigh and leg with initial concern for compartment syndrome. Orthopedic surgery evaluated patient and did not think a fasciotomy was needed as patient was outside time window.

## 2019-07-19 NOTE — PLAN OF CARE
Problem: Pain:  Goal: Control of acute pain  7/19/2019 0131 by Kirill Gonzalez RN  Outcome: Ongoing  Note:   PRN percocet given, pain controlled. Problem: Falls - Risk of:  Goal: Will remain free from falls  7/19/2019 0131 by Kirill Gonzalez RN  Outcome: Ongoing  Note:   Patient remains free of falls and accidental injury. Patient assessed as a high fall risk. Bed in lowest position, bed alarm on, non-skid footwear in place, call light in reach, instructed patients to call for needs or assistance out of bed. Will continue to monitor.

## 2019-07-20 LAB
A/G RATIO: 1.4 (ref 1.1–2.2)
ALBUMIN SERPL-MCNC: 3.4 G/DL (ref 3.4–5)
ALP BLD-CCNC: 67 U/L (ref 40–129)
ALT SERPL-CCNC: 53 U/L (ref 10–40)
ANION GAP SERPL CALCULATED.3IONS-SCNC: 11 MMOL/L (ref 3–16)
AST SERPL-CCNC: 74 U/L (ref 15–37)
BASOPHILS ABSOLUTE: 0 K/UL (ref 0–0.2)
BASOPHILS RELATIVE PERCENT: 0.3 %
BILIRUB SERPL-MCNC: 0.3 MG/DL (ref 0–1)
BUN BLDV-MCNC: 13 MG/DL (ref 7–20)
CALCIUM SERPL-MCNC: 8.3 MG/DL (ref 8.3–10.6)
CHLORIDE BLD-SCNC: 105 MMOL/L (ref 99–110)
CO2: 26 MMOL/L (ref 21–32)
CREAT SERPL-MCNC: 0.8 MG/DL (ref 0.9–1.3)
EOSINOPHILS ABSOLUTE: 0.1 K/UL (ref 0–0.6)
EOSINOPHILS RELATIVE PERCENT: 2 %
GFR AFRICAN AMERICAN: >60
GFR NON-AFRICAN AMERICAN: >60
GLOBULIN: 2.5 G/DL
GLUCOSE BLD-MCNC: 108 MG/DL (ref 70–99)
HCT VFR BLD CALC: 37.2 % (ref 40.5–52.5)
HEMOGLOBIN: 12.5 G/DL (ref 13.5–17.5)
LYMPHOCYTES ABSOLUTE: 1.2 K/UL (ref 1–5.1)
LYMPHOCYTES RELATIVE PERCENT: 24.3 %
MCH RBC QN AUTO: 28.8 PG (ref 26–34)
MCHC RBC AUTO-ENTMCNC: 33.5 G/DL (ref 31–36)
MCV RBC AUTO: 85.9 FL (ref 80–100)
MONOCYTES ABSOLUTE: 0.5 K/UL (ref 0–1.3)
MONOCYTES RELATIVE PERCENT: 8.8 %
NEUTROPHILS ABSOLUTE: 3.3 K/UL (ref 1.7–7.7)
NEUTROPHILS RELATIVE PERCENT: 64.6 %
PDW BLD-RTO: 13.6 % (ref 12.4–15.4)
PLATELET # BLD: 118 K/UL (ref 135–450)
PMV BLD AUTO: 8 FL (ref 5–10.5)
POTASSIUM REFLEX MAGNESIUM: 4.2 MMOL/L (ref 3.5–5.1)
RBC # BLD: 4.33 M/UL (ref 4.2–5.9)
SODIUM BLD-SCNC: 142 MMOL/L (ref 136–145)
TOTAL CK: 2621 U/L (ref 39–308)
TOTAL PROTEIN: 5.9 G/DL (ref 6.4–8.2)
WBC # BLD: 5.1 K/UL (ref 4–11)

## 2019-07-20 PROCEDURE — 6360000002 HC RX W HCPCS: Performed by: STUDENT IN AN ORGANIZED HEALTH CARE EDUCATION/TRAINING PROGRAM

## 2019-07-20 PROCEDURE — 80053 COMPREHEN METABOLIC PANEL: CPT

## 2019-07-20 PROCEDURE — 36415 COLL VENOUS BLD VENIPUNCTURE: CPT

## 2019-07-20 PROCEDURE — 82550 ASSAY OF CK (CPK): CPT

## 2019-07-20 PROCEDURE — 6370000000 HC RX 637 (ALT 250 FOR IP): Performed by: STUDENT IN AN ORGANIZED HEALTH CARE EDUCATION/TRAINING PROGRAM

## 2019-07-20 PROCEDURE — 85025 COMPLETE CBC W/AUTO DIFF WBC: CPT

## 2019-07-20 PROCEDURE — 1200000000 HC SEMI PRIVATE

## 2019-07-20 PROCEDURE — 2580000003 HC RX 258: Performed by: INTERNAL MEDICINE

## 2019-07-20 PROCEDURE — 2500000003 HC RX 250 WO HCPCS: Performed by: STUDENT IN AN ORGANIZED HEALTH CARE EDUCATION/TRAINING PROGRAM

## 2019-07-20 PROCEDURE — 2580000003 HC RX 258: Performed by: STUDENT IN AN ORGANIZED HEALTH CARE EDUCATION/TRAINING PROGRAM

## 2019-07-20 RX ADMIN — CLINDAMYCIN IN 5 PERCENT DEXTROSE 300 MG: 6 INJECTION, SOLUTION INTRAVENOUS at 10:12

## 2019-07-20 RX ADMIN — CEFTRIAXONE 1 G: 1 INJECTION, POWDER, FOR SOLUTION INTRAMUSCULAR; INTRAVENOUS at 14:50

## 2019-07-20 RX ADMIN — ENOXAPARIN SODIUM 30 MG: 30 INJECTION SUBCUTANEOUS at 10:12

## 2019-07-20 RX ADMIN — CLINDAMYCIN IN 5 PERCENT DEXTROSE 300 MG: 6 INJECTION, SOLUTION INTRAVENOUS at 21:35

## 2019-07-20 RX ADMIN — CLINDAMYCIN IN 5 PERCENT DEXTROSE 300 MG: 6 INJECTION, SOLUTION INTRAVENOUS at 15:40

## 2019-07-20 RX ADMIN — ENOXAPARIN SODIUM 30 MG: 30 INJECTION SUBCUTANEOUS at 21:35

## 2019-07-20 RX ADMIN — SODIUM CHLORIDE: 9 INJECTION, SOLUTION INTRAVENOUS at 19:51

## 2019-07-20 RX ADMIN — DIVALPROEX SODIUM 125 MG: 125 TABLET, DELAYED RELEASE ORAL at 14:50

## 2019-07-20 RX ADMIN — DIVALPROEX SODIUM 125 MG: 125 TABLET, DELAYED RELEASE ORAL at 21:34

## 2019-07-20 RX ADMIN — CLINDAMYCIN IN 5 PERCENT DEXTROSE 300 MG: 6 INJECTION, SOLUTION INTRAVENOUS at 03:52

## 2019-07-20 RX ADMIN — DIVALPROEX SODIUM 125 MG: 125 TABLET, DELAYED RELEASE ORAL at 10:12

## 2019-07-20 RX ADMIN — Medication 10 ML: at 10:15

## 2019-07-20 RX ADMIN — SODIUM CHLORIDE: 9 INJECTION, SOLUTION INTRAVENOUS at 11:11

## 2019-07-20 RX ADMIN — SODIUM CHLORIDE: 9 INJECTION, SOLUTION INTRAVENOUS at 05:12

## 2019-07-20 ASSESSMENT — PAIN SCALES - GENERAL
PAINLEVEL_OUTOF10: 0

## 2019-07-20 NOTE — PROGRESS NOTES
- Percocet q6h PRN  - Vascular checks q4h  - Monitor for fevers, leukocytosis   - Started on clindamycin and ceftriaxone 7/17  - Will be discharged on clindamycin 600mg tid 6d   - Social work consulted   - Will follow up with orthopedic surgery outpt     Rhabdomyolysis (improving) - CK elevated > 34,000 on admission, downtrended to levels safe for discharge. Patient reportedly fell and was unable to get up and remained on the ground for 4-6 hours. Potassium and Cr WNL. Urine reportedly dark. - IVFs @ 150mL/hour  - UA  - Telemetry monitoring  - Trend CK   - Monitor K      Sinus arrhythmia- Patient noted to have sinus arrhythmia on tele, converted to sinus rhythm 7/16 EKG confirmed. Patient denies history of A-fib. ChadsVasc score 0, will not start AC at this time.    - Tele shows sinus rhythm  - TSH and free T4 wnl  - ECHO showed EF 50-55%, no structural abnormalities     Bipolar disease   - Cont home Depakote     Code Status: Full  FEN: general  PPX: lovenox  DISPO: Linda Moore MD, PGY-1  07/20/19  6:48 AM    This patient has been staffed and discussed with Linda Cronin MD.

## 2019-07-21 PROCEDURE — 2580000003 HC RX 258: Performed by: STUDENT IN AN ORGANIZED HEALTH CARE EDUCATION/TRAINING PROGRAM

## 2019-07-21 PROCEDURE — 2500000003 HC RX 250 WO HCPCS: Performed by: STUDENT IN AN ORGANIZED HEALTH CARE EDUCATION/TRAINING PROGRAM

## 2019-07-21 PROCEDURE — 6370000000 HC RX 637 (ALT 250 FOR IP): Performed by: INTERNAL MEDICINE

## 2019-07-21 PROCEDURE — 6370000000 HC RX 637 (ALT 250 FOR IP): Performed by: STUDENT IN AN ORGANIZED HEALTH CARE EDUCATION/TRAINING PROGRAM

## 2019-07-21 PROCEDURE — 6360000002 HC RX W HCPCS: Performed by: STUDENT IN AN ORGANIZED HEALTH CARE EDUCATION/TRAINING PROGRAM

## 2019-07-21 PROCEDURE — 1200000000 HC SEMI PRIVATE

## 2019-07-21 RX ORDER — POLYVINYL ALCOHOL 14 MG/ML
2 SOLUTION/ DROPS OPHTHALMIC PRN
Status: DISCONTINUED | OUTPATIENT
Start: 2019-07-21 | End: 2019-07-22 | Stop reason: HOSPADM

## 2019-07-21 RX ADMIN — CLINDAMYCIN IN 5 PERCENT DEXTROSE 300 MG: 6 INJECTION, SOLUTION INTRAVENOUS at 03:40

## 2019-07-21 RX ADMIN — Medication 10 ML: at 21:25

## 2019-07-21 RX ADMIN — CEFTRIAXONE 1 G: 1 INJECTION, POWDER, FOR SOLUTION INTRAMUSCULAR; INTRAVENOUS at 14:45

## 2019-07-21 RX ADMIN — DIVALPROEX SODIUM 125 MG: 125 TABLET, DELAYED RELEASE ORAL at 20:51

## 2019-07-21 RX ADMIN — CLINDAMYCIN IN 5 PERCENT DEXTROSE 300 MG: 6 INJECTION, SOLUTION INTRAVENOUS at 08:57

## 2019-07-21 RX ADMIN — CLINDAMYCIN IN 5 PERCENT DEXTROSE 300 MG: 6 INJECTION, SOLUTION INTRAVENOUS at 21:22

## 2019-07-21 RX ADMIN — DIVALPROEX SODIUM 125 MG: 125 TABLET, DELAYED RELEASE ORAL at 14:44

## 2019-07-21 RX ADMIN — DIVALPROEX SODIUM 125 MG: 125 TABLET, DELAYED RELEASE ORAL at 08:56

## 2019-07-21 RX ADMIN — Medication 10 ML: at 09:56

## 2019-07-21 RX ADMIN — POLYVINYL ALCOHOL 2 DROP: 14 SOLUTION/ DROPS OPHTHALMIC at 20:51

## 2019-07-21 RX ADMIN — ENOXAPARIN SODIUM 30 MG: 30 INJECTION SUBCUTANEOUS at 20:51

## 2019-07-21 RX ADMIN — ENOXAPARIN SODIUM 30 MG: 30 INJECTION SUBCUTANEOUS at 08:57

## 2019-07-21 RX ADMIN — CLINDAMYCIN IN 5 PERCENT DEXTROSE 300 MG: 6 INJECTION, SOLUTION INTRAVENOUS at 15:51

## 2019-07-21 ASSESSMENT — PAIN SCALES - GENERAL
PAINLEVEL_OUTOF10: 0

## 2019-07-22 VITALS
SYSTOLIC BLOOD PRESSURE: 137 MMHG | TEMPERATURE: 98 F | WEIGHT: 240 LBS | BODY MASS INDEX: 34.36 KG/M2 | HEIGHT: 70 IN | DIASTOLIC BLOOD PRESSURE: 80 MMHG | HEART RATE: 68 BPM | OXYGEN SATURATION: 98 % | RESPIRATION RATE: 16 BRPM

## 2019-07-22 PROCEDURE — 97116 GAIT TRAINING THERAPY: CPT

## 2019-07-22 PROCEDURE — 2580000003 HC RX 258: Performed by: STUDENT IN AN ORGANIZED HEALTH CARE EDUCATION/TRAINING PROGRAM

## 2019-07-22 PROCEDURE — 97535 SELF CARE MNGMENT TRAINING: CPT

## 2019-07-22 PROCEDURE — 6370000000 HC RX 637 (ALT 250 FOR IP): Performed by: STUDENT IN AN ORGANIZED HEALTH CARE EDUCATION/TRAINING PROGRAM

## 2019-07-22 PROCEDURE — 6360000002 HC RX W HCPCS: Performed by: STUDENT IN AN ORGANIZED HEALTH CARE EDUCATION/TRAINING PROGRAM

## 2019-07-22 PROCEDURE — 2500000003 HC RX 250 WO HCPCS: Performed by: STUDENT IN AN ORGANIZED HEALTH CARE EDUCATION/TRAINING PROGRAM

## 2019-07-22 PROCEDURE — 97530 THERAPEUTIC ACTIVITIES: CPT

## 2019-07-22 RX ORDER — CLINDAMYCIN HYDROCHLORIDE 300 MG/1
600 CAPSULE ORAL 2 TIMES DAILY
Qty: 24 CAPSULE | Refills: 0 | OUTPATIENT
Start: 2019-07-22 | End: 2019-07-25

## 2019-07-22 RX ADMIN — CLINDAMYCIN IN 5 PERCENT DEXTROSE 300 MG: 6 INJECTION, SOLUTION INTRAVENOUS at 15:13

## 2019-07-22 RX ADMIN — ENOXAPARIN SODIUM 30 MG: 30 INJECTION SUBCUTANEOUS at 08:47

## 2019-07-22 RX ADMIN — Medication 10 ML: at 08:47

## 2019-07-22 RX ADMIN — CLINDAMYCIN IN 5 PERCENT DEXTROSE 300 MG: 6 INJECTION, SOLUTION INTRAVENOUS at 04:06

## 2019-07-22 RX ADMIN — OXYCODONE HYDROCHLORIDE AND ACETAMINOPHEN 1 TABLET: 5; 325 TABLET ORAL at 02:05

## 2019-07-22 RX ADMIN — POLYVINYL ALCOHOL 2 DROP: 14 SOLUTION/ DROPS OPHTHALMIC at 08:50

## 2019-07-22 RX ADMIN — DIVALPROEX SODIUM 125 MG: 125 TABLET, DELAYED RELEASE ORAL at 14:35

## 2019-07-22 RX ADMIN — CLINDAMYCIN IN 5 PERCENT DEXTROSE 300 MG: 6 INJECTION, SOLUTION INTRAVENOUS at 08:47

## 2019-07-22 RX ADMIN — DIVALPROEX SODIUM 125 MG: 125 TABLET, DELAYED RELEASE ORAL at 08:47

## 2019-07-22 RX ADMIN — CEFTRIAXONE 1 G: 1 INJECTION, POWDER, FOR SOLUTION INTRAMUSCULAR; INTRAVENOUS at 14:35

## 2019-07-22 ASSESSMENT — PAIN DESCRIPTION - PAIN TYPE: TYPE: ACUTE PAIN

## 2019-07-22 ASSESSMENT — PAIN DESCRIPTION - ONSET: ONSET: AWAKENED FROM SLEEP

## 2019-07-22 ASSESSMENT — PAIN SCALES - GENERAL
PAINLEVEL_OUTOF10: 0
PAINLEVEL_OUTOF10: 0
PAINLEVEL_OUTOF10: 9

## 2019-07-22 ASSESSMENT — PAIN DESCRIPTION - FREQUENCY: FREQUENCY: CONTINUOUS

## 2019-07-22 ASSESSMENT — PAIN DESCRIPTION - DESCRIPTORS: DESCRIPTORS: ACHING;BURNING

## 2019-07-22 ASSESSMENT — PAIN DESCRIPTION - PROGRESSION: CLINICAL_PROGRESSION: GRADUALLY WORSENING

## 2019-07-22 ASSESSMENT — PAIN DESCRIPTION - ORIENTATION: ORIENTATION: RIGHT

## 2019-07-22 ASSESSMENT — PAIN DESCRIPTION - LOCATION: LOCATION: LEG

## 2019-07-22 ASSESSMENT — PAIN - FUNCTIONAL ASSESSMENT: PAIN_FUNCTIONAL_ASSESSMENT: PREVENTS OR INTERFERES SOME ACTIVE ACTIVITIES AND ADLS

## 2019-07-22 NOTE — CARE COORDINATION
home medications/ co-pay costs: Yes    ADLS:  Current PT AM-PAC Score: 17 /24  Current OT AM-PAC Score: 19 /24      DISCHARGE Disposition: [facility] (SNF): [facility] Phone: [phone] Fax: [phone]    LOC at discharge: Skilled  Tomas Gonzales Completed: Yes    Notification completed in HENS/PAS?:  Yes : CM has completed HENS online through secure website for SNF admission at [facility]. Document ID #: [id number]    IMM Completed:   Not Indicated    Transportation:  Transportation PLAN for discharge: EMS transportation   Mode of Transport: Ambulance stretcher - BLS  Reason for medical transport: Moderate to Severe pain related to compartment syndrome in right leg requires ambulance transport due to severe pain if limb is dependent  Name of [address]: [address]  Phone: [phone]  Time of Transport: 1930    Transport form completed: Yes        Referrals made at [facility] for outpatient continued care:  Not Applicable    Additional CM Notes: Spoke with patient and informed him of discharge plans. Eladio Enter and his family were provided with choice of provider; he and his family are in agreement with the discharge plan.     Care Transitions patient: No Aram Goodpasture, RN  [facility]  Case Management Department  Ph: [phone]  QCT:[phone]

## 2019-07-22 NOTE — CARE COORDINATION
Spoke with Jacki Goff at [facility] and precert was obtained on Friday and is no longer valid. We will need updated PT/OT arsenio and Barbara NICOLE was informed of this. Once these are available Jacki Goff can re-submit for auth.      Electronically signed by Kennedy Wright RN on 7/22/2019 at 11:24 AM  [phone]

## 2019-07-22 NOTE — PROGRESS NOTES
Progress Note    Admit Date: 7/15/2019  Day: 7  Diet: DIET GENERAL;    CC: right leg pain, fall, spider bite    Interval history: Pt feeling better this AM.  Says new pain regimen (percocet) is controlling his pain much better.  Still notes some tenderness and swelling of the leg, but stronger. Abdominal pain improved on percocet.  Able to ambulate with physical therapy, says very painful to walk, does not get up otherwise.  Eating, drinking appropriately.  Urine clear with no burning.  Denies any fevers, chest pain, palpitations, cough, shortness of breath, difficulty breathing, wheezing, nausea, vomiting, diarrhea. HPI: Mr Damir Conley is a 47 yo m with PMHx of Hep B, bipolar disorder, arthritis, GIB, R plant fascitis who presented to the ED with R leg pain and weakness since Saturday afternoon.  Pt endorsed spider bite and fall at home, was on ground for hours.  While in ED CK >34,000 and tele identified sinus arrhythmia.  Concern for compartment syndrome led to a consult from ortho and vascular and vasc surg, found a pulse with doppler.  Fasciotomy excluded from treatment options.  Since then, aggressive IVF utilized to treat rhabdomyolysis, CK trended down. Antibiotics started (clinda and ceftriaxone) for concern for cellulitis around spider bite. Cardiology saw for arrhythmia, want to follow up outpt at a later date for a Holter monitor.     Medications:     Scheduled Meds:   cefTRIAXone (ROCEPHIN) IV  1 g Intravenous Q24H    clindamycin (CLEOCIN) IV  300 mg Intravenous Q6H    divalproex  125 mg Oral TID    sodium chloride flush  10 mL Intravenous 2 times per day    enoxaparin  30 mg Subcutaneous BID     Continuous Infusions:  PRN Meds:polyvinyl alcohol, oxyCODONE-acetaminophen, sodium chloride flush, magnesium hydroxide, ondansetron    Objective:   Vitals:   T-max:  Patient Vitals for the past 8 hrs:   BP Temp Temp src Pulse Resp SpO2   07/22/19 1421 137/80 -- -- 68 16 98 %   07/22/19 1145 130/72 98 °F

## 2019-07-22 NOTE — DISCHARGE SUMMARY
IV removed. All belongings packed. EMS transportation at bedside to transport patient to [facility]. This RN called [facility] and gave report to [address] [place] [address]. Call back number is [phone].
be involved in this patient's care. If you have any questions or concerns please feel free to contact me.

## 2019-07-22 NOTE — PROGRESS NOTES
AM-PAC Score        AM-PAC Inpatient Daily Activity Raw Score: 19 (07/22/19 1259)  AM-PAC Inpatient ADL T-Scale Score : 40.22 (07/22/19 1259)  ADL Inpatient CMS 0-100% Score: 42.8 (07/22/19 1259)  ADL Inpatient CMS G-Code Modifier : CK (07/22/19 1259)    Goals  Short term goals  Time Frame for Short term goals: Discharge  Short term goal 1: Transfer to/from toilet with supervision - goal not met 7/22  Short term goal 2: Stance with supervision x4 min while engaging in ADL/functional mobility - goal not met 7/22  Short term goal 3: Lower body dressing with min assist - goal partially met 7/22, continue to consistency (very effortful and painful)  Patient Goals   Patient goals : Go home. Be able to be left alone. Therapy Time   Individual Concurrent Group Co-treatment   Time In 1155         Time Out 1226         Minutes 31         Timed Code Treatment Minutes: 31 Minutes     If patient discharges prior to next treatment, this note will serve as discharge summary. Continue per POC if patient does not discharge.      Zee Riddle, OT   OTR/L, 9950

## 2019-08-13 ENCOUNTER — TELEPHONE (OUTPATIENT)
Dept: CARDIOLOGY CLINIC | Age: 54
End: 2019-08-13

## 2019-08-13 ENCOUNTER — OFFICE VISIT (OUTPATIENT)
Dept: CARDIOLOGY CLINIC | Age: 54
End: 2019-08-13
Payer: COMMERCIAL

## 2019-08-13 VITALS
DIASTOLIC BLOOD PRESSURE: 70 MMHG | WEIGHT: 253 LBS | HEART RATE: 76 BPM | BODY MASS INDEX: 36.3 KG/M2 | SYSTOLIC BLOOD PRESSURE: 114 MMHG

## 2019-08-13 DIAGNOSIS — I49.1 PAC (PREMATURE ATRIAL CONTRACTION): ICD-10-CM

## 2019-08-13 DIAGNOSIS — R00.2 PALPITATIONS: ICD-10-CM

## 2019-08-13 DIAGNOSIS — T63.301S SPIDER BITE WOUND, ACCIDENTAL OR UNINTENTIONAL, SEQUELA: ICD-10-CM

## 2019-08-13 DIAGNOSIS — R55 SYNCOPE, UNSPECIFIED SYNCOPE TYPE: Primary | ICD-10-CM

## 2019-08-13 PROCEDURE — G8417 CALC BMI ABV UP PARAM F/U: HCPCS | Performed by: NURSE PRACTITIONER

## 2019-08-13 PROCEDURE — 3017F COLORECTAL CA SCREEN DOC REV: CPT | Performed by: NURSE PRACTITIONER

## 2019-08-13 PROCEDURE — 1036F TOBACCO NON-USER: CPT | Performed by: NURSE PRACTITIONER

## 2019-08-13 PROCEDURE — 93000 ELECTROCARDIOGRAM COMPLETE: CPT | Performed by: NURSE PRACTITIONER

## 2019-08-13 PROCEDURE — 99214 OFFICE O/P EST MOD 30 MIN: CPT | Performed by: NURSE PRACTITIONER

## 2019-08-13 PROCEDURE — 1111F DSCHRG MED/CURRENT MED MERGE: CPT | Performed by: NURSE PRACTITIONER

## 2019-08-13 PROCEDURE — G8427 DOCREV CUR MEDS BY ELIG CLIN: HCPCS | Performed by: NURSE PRACTITIONER

## 2019-08-13 RX ORDER — M-VIT,TX,IRON,MINS/CALC/FOLIC 27MG-0.4MG
1 TABLET ORAL DAILY
COMMUNITY

## 2019-08-13 RX ORDER — WATER / MINERAL OIL / WHITE PETROLATUM 16 OZ
CREAM TOPICAL PRN
COMMUNITY

## 2019-08-13 RX ORDER — SENNA AND DOCUSATE SODIUM 50; 8.6 MG/1; MG/1
1 TABLET, FILM COATED ORAL DAILY
COMMUNITY

## 2019-08-13 RX ORDER — PREDNISONE 20 MG/1
20 TABLET ORAL DAILY
COMMUNITY

## 2019-08-13 RX ORDER — GABAPENTIN 600 MG/1
600 TABLET ORAL 3 TIMES DAILY
COMMUNITY

## 2019-08-13 RX ORDER — POLYVINYL ALCOHOL 14 MG/ML
1 SOLUTION/ DROPS OPHTHALMIC PRN
COMMUNITY

## 2019-08-13 RX ORDER — OXYCODONE HYDROCHLORIDE AND ACETAMINOPHEN 5; 325 MG/1; MG/1
1 TABLET ORAL EVERY 4 HOURS PRN
COMMUNITY

## 2019-08-13 NOTE — PROGRESS NOTES
disorder who p/w a spider bite, syncopal event and fall and had been on the floor for hours then did not present to the ED until a couple of days later. Thought to have an episode of AF on tele however review of tele shows NSR/SB and PACs.      Sinus arrhythmia/PACs/syncope  - Frequent noted PACs on tele  - 30 day [employer]  - F/u with Dr. Kathy Cook in 8 weeks  - ECG ordered and results personally reviewed       EF of 50 to 70%  No systolic HF  No known CAD  No known AF   No Tobacco use. All questions and concerns were addressed to the patient/family. Alternatives to my treatment were discussed. The note was completed using EMR. Every effort was made to ensure accuracy; however, inadvertent computerized transcription errors may be present. Patient received education regarding their diagnosis, treatment and medications while in the office today.       Sammi Pandey [address]

## 2019-10-28 ENCOUNTER — TELEPHONE (OUTPATIENT)
Dept: CARDIOLOGY CLINIC | Age: 54
End: 2019-10-28

## 2019-10-28 DIAGNOSIS — R00.2 PALPITATIONS: ICD-10-CM

## 2019-10-29 PROCEDURE — 93228 REMOTE 30 DAY ECG REV/REPORT: CPT | Performed by: INTERNAL MEDICINE

## 2022-04-10 ENCOUNTER — HOSPITAL ENCOUNTER (EMERGENCY)
Age: 57
Discharge: HOME | End: 2022-04-10
Attending: EMERGENCY MEDICINE
Payer: COMMERCIAL

## 2022-04-10 ENCOUNTER — APPOINTMENT (OUTPATIENT)
Dept: GENERAL RADIOLOGY | Age: 57
End: 2022-04-10
Payer: COMMERCIAL

## 2022-04-10 VITALS
TEMPERATURE: 98.3 F | HEART RATE: 88 BPM | OXYGEN SATURATION: 99 % | SYSTOLIC BLOOD PRESSURE: 114 MMHG | RESPIRATION RATE: 18 BRPM | DIASTOLIC BLOOD PRESSURE: 63 MMHG

## 2022-04-10 DIAGNOSIS — J68.0: Primary | ICD-10-CM

## 2022-04-10 PROCEDURE — 99284 EMERGENCY DEPT VISIT MOD MDM: CPT

## 2022-04-10 PROCEDURE — 71045 X-RAY EXAM CHEST 1 VIEW: CPT

## 2022-04-10 ASSESSMENT — PAIN SCALES - GENERAL: PAINLEVEL_OUTOF10: 4

## 2022-04-10 ASSESSMENT — ENCOUNTER SYMPTOMS
GASTROINTESTINAL NEGATIVE: 1
COUGH: 1
SHORTNESS OF BREATH: 1
EYES NEGATIVE: 1

## 2022-04-10 ASSESSMENT — PAIN - FUNCTIONAL ASSESSMENT: PAIN_FUNCTIONAL_ASSESSMENT: 0-10

## 2022-04-10 ASSESSMENT — PAIN DESCRIPTION - PAIN TYPE: TYPE: ACUTE PAIN

## 2022-04-10 NOTE — ED PROVIDER NOTES
[address] ENCOUNTER          ATTENDING PHYSICIAN NOTE       Date of evaluation: 4/10/2022    Chief Complaint     Other (Pt. presents to ED after possible inhalation of cleaning products while attempting to combat bug infestation at pt. home. )      History of Present Illness     Yanet Amaya is a 49 y.o. male who presents complaining of shortness of breath. Patient has a history of bipolar disorder so is a difficult historian. Patient is tangential but states that he believes that there is scabies all about his house. He states that he felt like he needed to clean the kitchen floor to get rid of them and combined Lysol and bleach in order to do this. He states after this he started to feel like he could not breathe. He was concerned that the scabies were getting into his lungs. He denies any chest pain or pressure. He does note a cough that is nonproductive of sputum that started approximately an hour ago. He was feeling no shortness of breath prior to this. Review of Systems     Review of Systems   Constitutional: Negative. HENT: Negative. Eyes: Negative. Respiratory: Positive for cough and shortness of breath. Cardiovascular: Negative. Gastrointestinal: Negative. Genitourinary: Negative. Musculoskeletal: Negative. Neurological: Negative. All other systems reviewed and are negative. Past Medical, Surgical, Family, and Social History     He has a past medical history of Arthritis and Bipolar 1 disorder ([address] He has no past surgical history on file. His family history is not on file. He reports that he has never smoked. He has never used smokeless tobacco. He reports current drug use. Drug: Marijuana [employer]). He reports that he does not drink alcohol.     Medications     Current Discharge Medication List      CONTINUE these medications which have NOT CHANGED    Details   polyvinyl alcohol (LIQUIFILM TEARS) 1.4 % ophthalmic solution 1 drop as needed      Skin Protectants, Misc. (EUCERIN) cream Apply topically as needed for Dry Skin Apply topically as needed. gabapentin (NEURONTIN) 600 MG tablet Take 600 mg by mouth 3 times daily. Multiple Vitamins-Minerals (THERAPEUTIC MULTIVITAMIN-MINERALS) tablet Take 1 tablet by mouth daily      oxyCODONE-acetaminophen (PERCOCET) 5-325 MG per tablet Take 1 tablet by mouth every 4 hours as needed for Pain. predniSONE (DELTASONE) 20 MG tablet Take 20 mg by mouth daily      sennosides-docusate sodium (SENOKOT-S) 8.6-50 MG tablet Take 1 tablet by mouth daily      divalproex (DEPAKOTE) 125 MG DR tablet Take 125 mg by mouth 3 times daily             Allergies     He is allergic to acetaminophen and aspirin. Physical Exam     INITIAL VITALS: BP: 133/71, Temp: 98.3 °F (36.8 °C), Pulse: 88, Resp: 18, SpO2: 99 %   Physical Exam  Vitals and nursing note reviewed. Constitutional:       General: He is not in acute distress. HENT:      Head: Normocephalic and atraumatic. Mouth/Throat:      Mouth: Mucous membranes are moist.      Pharynx: No oropharyngeal exudate. Eyes:      General: No scleral icterus. Extraocular Movements: Extraocular movements intact. Conjunctiva/sclera: Conjunctivae normal.      Pupils: Pupils are equal, round, and reactive to light. Cardiovascular:      Rate and Rhythm: Normal rate and regular rhythm. Heart sounds: Normal heart sounds. Pulmonary:      Effort: Pulmonary effort is normal.      Breath sounds: Normal breath sounds. No wheezing, rhonchi or rales. Abdominal:      General: Bowel sounds are normal.      Palpations: Abdomen is soft. Tenderness: There is no abdominal tenderness. There is no guarding or rebound. Musculoskeletal:         General: No swelling. Normal range of motion. Cervical back: Normal range of motion and neck supple. Skin:     General: Skin is warm and dry. Neurological:      General: No focal deficit present. Mental Status: He is alert and oriented to person, place, and time. Cranial Nerves: No cranial nerve deficit. Motor: No weakness. Coordination: Coordination normal.         Diagnostic Results     RADIOLOGY:  XR CHEST PORTABLE   Final Result     No evidence of acute cardiopulmonary disease. RECENT VITALS:  BP: 114/63,Temp: 98.3 °F (36.8 °C), Pulse: 88, Resp: 18, SpO2: 99 %     Procedures     N/A    ED Course     Nursing Notes, Past Medical Hx, Past Surgical Hx, Social Hx,Allergies, and Family Hx were reviewed. patient was given the following medications:  No orders of the defined types were placed in this encounter. CONSULTS:  None    MEDICAL DECISIONMAKING / ASSESSMENT / PLAN     Dm Mcdonnell is a 49 y.o. male with history of psychiatric disease presents complaining of shortness of breath. Patient states that he was trying to clean scabies out of his house because he has had multiple issues with this and mixed Lysol and bleach to clean his kitchen floor. He states that he soon afterward developed shortness of breath and cough. On arrival, patient is hemodynamically stable. He has clear breath sounds bilaterally. Chest x-ray shows no acute airspace disease. Patient was observed in the emergency department and without intervention had complete resolution of his cough. I feel most likely the patient had a slight chemical pneumonitis due to chemical fume exposure. I have low suspicion for an infectious etiology so do not feel that antibiotics are indicated. Patient will be instructed to follow-up with a primary care provider of his choice. Clinical Impression     1.  Chemical pneumonia [facility])        Disposition     PATIENT REFERRED TO:  Primary care provider of your choice            DISCHARGE MEDICATIONS:  Current Discharge Medication List          DISPOSITION Decision To Discharge 04/10/2022 05:52:31 AM        Erick Reddy MD  04/10/22 3973

## 2022-04-10 NOTE — ED NOTES
Pt. Presents to ED with a harsh cough after mixing some cleaning products at home. Pt. Breathing easy and also c/o skin irrigation, that pt. Admits has been managing a bug infestation for several months.       Alirio Rock RN  04/10/22 3836

## 2022-07-16 ENCOUNTER — HOSPITAL ENCOUNTER (EMERGENCY)
Age: 57
Discharge: HOME | End: 2022-07-16
Attending: EMERGENCY MEDICINE
Payer: COMMERCIAL

## 2022-07-16 VITALS
OXYGEN SATURATION: 98 % | HEIGHT: 70 IN | RESPIRATION RATE: 18 BRPM | DIASTOLIC BLOOD PRESSURE: 75 MMHG | WEIGHT: 225 LBS | BODY MASS INDEX: 32.21 KG/M2 | HEART RATE: 73 BPM | TEMPERATURE: 97 F | SYSTOLIC BLOOD PRESSURE: 133 MMHG

## 2022-07-16 DIAGNOSIS — F31.9 BIPOLAR 1 DISORDER (HCC): ICD-10-CM

## 2022-07-16 DIAGNOSIS — Z59.00 HOMELESS: Primary | ICD-10-CM

## 2022-07-16 PROCEDURE — 6370000000 HC RX 637 (ALT 250 FOR IP): Performed by: EMERGENCY MEDICINE

## 2022-07-16 PROCEDURE — 99283 EMERGENCY DEPT VISIT LOW MDM: CPT

## 2022-07-16 RX ORDER — ARIPIPRAZOLE 30 MG/1
TABLET ORAL
COMMUNITY

## 2022-07-16 RX ORDER — BUPROPION HYDROCHLORIDE 150 MG/1
TABLET ORAL
COMMUNITY

## 2022-07-16 RX ORDER — FAMOTIDINE 20 MG/1
TABLET, FILM COATED ORAL
COMMUNITY
Start: 2022-05-18

## 2022-07-16 RX ORDER — HALOPERIDOL 5 MG
5 TABLET ORAL ONCE
Status: COMPLETED | OUTPATIENT
Start: 2022-07-16 | End: 2022-07-16

## 2022-07-16 RX ORDER — OMEPRAZOLE 20 MG/1
CAPSULE, DELAYED RELEASE ORAL
COMMUNITY
Start: 2022-07-01 | End: 2022-08-17

## 2022-07-16 RX ORDER — TOPIRAMATE 50 MG/1
TABLET, FILM COATED ORAL
COMMUNITY
End: 2022-08-17

## 2022-07-16 RX ORDER — DIPHENHYDRAMINE HCL 25 MG
25 TABLET ORAL ONCE
Status: COMPLETED | OUTPATIENT
Start: 2022-07-16 | End: 2022-07-16

## 2022-07-16 RX ORDER — BENZTROPINE MESYLATE 1 MG/1
TABLET ORAL
COMMUNITY
End: 2022-08-17

## 2022-07-16 RX ORDER — PALIPERIDONE 3 MG/1
TABLET, EXTENDED RELEASE ORAL
COMMUNITY
Start: 2022-07-01 | End: 2022-08-17

## 2022-07-16 RX ADMIN — HALOPERIDOL 5 MG: 5 TABLET ORAL at 15:52

## 2022-07-16 RX ADMIN — DIPHENHYDRAMINE HYDROCHLORIDE 25 MG: 25 TABLET ORAL at 15:52

## 2022-07-16 SDOH — ECONOMIC STABILITY - HOUSING INSECURITY: HOMELESSNESS UNSPECIFIED: Z59.00

## 2022-07-16 ASSESSMENT — PAIN SCALES - GENERAL: PAINLEVEL_OUTOF10: 4

## 2022-07-16 ASSESSMENT — PAIN DESCRIPTION - LOCATION: LOCATION: OTHER (COMMENT)

## 2022-07-16 ASSESSMENT — PAIN DESCRIPTION - PAIN TYPE: TYPE: ACUTE PAIN

## 2022-07-16 ASSESSMENT — PAIN - FUNCTIONAL ASSESSMENT: PAIN_FUNCTIONAL_ASSESSMENT: 0-10

## 2022-07-16 NOTE — ED PROVIDER NOTES
Emergency Department Encounter    Patient: Marion Oden  MRN: [id number]  : 1965  Date of Evaluation: 2022  ED Provider:  Shannon Berg MD    Triage Chief Complaint:   Other (Arrived per \"my ride\" requesting \"psych meds\")    [place]:  Marion Oden is a 56 y.o. male that presents to the ER for evaluation of requesting psych meds history of bipolar disorder depression labile mood prior history of methamphetamine homeless requesting help to sleep and rest.  Feet pain from walking, chronic [employer]. No endorsement of suicidal homicidal ideation. ROS - see HPI, below listed is current ROS at time of my eval:  General:  No fevers  Eyes:  No recent vison changes  ENT:  No sore throat, no nasal congestion  Cardiovascular:  No chest pain, no palpitations  Respiratory:  No shortness of breath  Gastrointestinal:  No pain, no nausea, no vomiting, no diarrhea  Musculoskeletal:  No muscle pain  Skin:  No rash  Neurologic:  no headache  Psychiatric:  + anxiety, + depression + fatigue  Genitourinary:  No dysuria  Endocrine:  No unexpected weight gain, no unexpected weight loss  Extremities:  no edema, no pain    Past Medical History:   Diagnosis Date    Arthritis     Bipolar 1 disorder ([facility] [address]      History reviewed. No pertinent surgical history. History reviewed. No pertinent family history.   Social History     Socioeconomic History    Marital status: Single     Spouse name: Not on file    Number of children: Not on file    Years of education: Not on file    Highest education level: Not on file   Occupational History    Not on file   Tobacco Use    Smoking status: Never    Smokeless tobacco: Never   Substance and Sexual Activity    Alcohol use: Never    Drug use: Yes     Types: Marijuana ([place]), Methamphetamines (Crystal Meth)     Comment: used meth \"couple of months ago\"    Sexual activity: Not on file   Other Topics Concern    Not on file   Social History Narrative    Not on file     Social Determinants of Health     Financial Resource Strain: Not on file   Food Insecurity: Not on file   Transportation Needs: Not on file   Physical Activity: Not on file   Stress: Not on file   Social Connections: Not on file   Intimate Partner Violence: Not on file   Housing Stability: Not on file     No current facility-administered medications for this encounter. Current Outpatient Medications   Medication Sig Dispense Refill    ARIPiprazole (ABILIFY) 30 MG tablet Take by mouth      benztropine (COGENTIN) 1 MG tablet Take by mouth      buPROPion (WELLBUTRIN XL) 150 MG extended release tablet Take by mouth      famotidine (PEPCID) 20 MG tablet       omeprazole (PRILOSEC) 20 MG delayed release capsule       paliperidone (INVEGA) 3 MG extended release tablet       topiramate (TOPAMAX) 50 MG tablet Take by mouth      polyvinyl alcohol (LIQUIFILM TEARS) 1.4 % ophthalmic solution 1 drop as needed      Skin Protectants, Misc. (EUCERIN) cream Apply topically as needed for Dry Skin Apply topically as needed. gabapentin (NEURONTIN) 600 MG tablet Take 600 mg by mouth 3 times daily. Multiple Vitamins-Minerals (THERAPEUTIC MULTIVITAMIN-MINERALS) tablet Take 1 tablet by mouth daily      oxyCODONE-acetaminophen (PERCOCET) 5-325 MG per tablet Take 1 tablet by mouth every 4 hours as needed for Pain.       predniSONE (DELTASONE) 20 MG tablet Take 20 mg by mouth daily      sennosides-docusate sodium (SENOKOT-S) 8.6-50 MG tablet Take 1 tablet by mouth daily      divalproex (DEPAKOTE) 125 MG DR tablet Take 125 mg by mouth 3 times daily       Allergies   Allergen Reactions    Acetaminophen      Liver dr advised to never take; GI bleed    Aspirin      GI bleed and Liver Dr told him to never take it       Nursing Notes Reviewed    Physical Exam:  Triage VS:    ED Triage Vitals   Enc Vitals Group      BP       Pulse       Resp       Temp       Temp src       SpO2       Weight       Height       Head Circumference       Peak Flow       Pain Score       Pain Loc       Pain Edu? Excl. in [address]? General appearance:  No acute distress. Skin:  Warm. Dry. Eye:  Extraocular movements intact. Ears, nose, mouth and throat:  Oral mucosa moist   Neck:  Trachea midline. Extremity:  Normal ROM     Heart:  Regular  Perfusion:  Within normal limits. Respiratory:  Respirations nonlabored. Abdominal:  Non distended. Neurological:  Alert and oriented times 3. No focal neuro deficits. Psychiatric: Flat affect, + depression, no suicidal or homicidal ideations    I have reviewed and interpreted all of the currently available lab results from this visit (if applicable):  No results found for this visit on 07/16/22. Radiographs (if obtained):  Radiologist's Report Reviewed:  No results found. EKG (if obtained): (All EKG's are interpreted by myself in the absence of a cardiologist)    MDM:  Patient presented with symptoms and clinical evidence of depression. Currently patient is not acutely suicidal or homicidal, the patient is not deemed a significant risk to themselves or others based on lack of suicidal ideations, intent to harm themselves/others. We did evaluate the patient here in the emergency department, I do believe they are medically cleared to be discharged home, they will need to follow-up as an outpatient with mental health, this was discussed at length with patient, family/friends. They are all in understanding and are in agreement with the plan, they will be discharged with referrals to mental health, return warnings given. Yoni yuan, was given Haldol and Benadryl outpatient use of his [facility] outpatient shelter information. Clinical Impression:  1. Homeless    2.  Bipolar 1 disorder [facility])      Disposition referral (if applicable):  [facility]  [phone]        Disposition medications (if applicable):  Discharge Medication List as of 7/16/2022  3:54 PM          Comment: Please note this report has been produced using speech recognition software and may contain errors related to that system including errors in grammar, punctuation, and spelling, as well as words and phrases that may be inappropriate. Efforts were made to edit the dictations.        Germaine Dawson MD  09/11/61 7910       Germaine Dawson MD  42/62/45 0170

## 2022-07-29 ENCOUNTER — HOSPITAL ENCOUNTER (EMERGENCY)
Age: 57
Discharge: HOME | End: 2022-07-29
Attending: STUDENT IN AN ORGANIZED HEALTH CARE EDUCATION/TRAINING PROGRAM
Payer: COMMERCIAL

## 2022-07-29 VITALS
TEMPERATURE: 98.2 F | HEIGHT: 70 IN | WEIGHT: 219 LBS | RESPIRATION RATE: 18 BRPM | OXYGEN SATURATION: 97 % | DIASTOLIC BLOOD PRESSURE: 63 MMHG | SYSTOLIC BLOOD PRESSURE: 116 MMHG | BODY MASS INDEX: 31.35 KG/M2 | HEART RATE: 81 BPM

## 2022-07-29 DIAGNOSIS — F48.9 MENTAL AND BEHAVIORAL PROBLEM: Primary | ICD-10-CM

## 2022-07-29 DIAGNOSIS — F69 MENTAL AND BEHAVIORAL PROBLEM: Primary | ICD-10-CM

## 2022-07-29 PROCEDURE — 99282 EMERGENCY DEPT VISIT SF MDM: CPT

## 2022-07-29 ASSESSMENT — ENCOUNTER SYMPTOMS
NAUSEA: 0
DIARRHEA: 0
COUGH: 0
CHEST TIGHTNESS: 0
ABDOMINAL PAIN: 0
SHORTNESS OF BREATH: 0
VOMITING: 0

## 2022-07-29 NOTE — ED PROVIDER NOTES
ED Attending Attestation Note     Date of evaluation: 7/29/2022    This patient was seen by the advance practice provider. I have seen and examined the patient, agree with the workup, evaluation, management and diagnosis. The care plan has been discussed. My assessment reveals 68-year-old gentleman otherwise healthy with a history of bipolar presenting for medical evaluation. The patient denies any suicidal homicidal ideation. He has had multiple visits to the hospital this month as well as the previous months. He is followed by PCP and has all his medications that he needs. Patient states that he has follow-up with [facility] and that is in the process of getting switched to a different psychiatry facility. Patient denies any complaints of chest pain, shortness of breath, nausea vomiting abdominal pain. On examination he is alert and oriented x4. He has clear breath sounds.      Omer Rangel MD  07/29/22 2415

## 2022-07-29 NOTE — ED NOTES
Patient discharged to home. Written discharge instructions reviewed with understanding. Copy of AVS sent home with patient. Patient able to walk from ED without assistance.         Karlos Tubbs RN  07/29/22 2826

## 2022-07-29 NOTE — ED PROVIDER NOTES
[address] [facility] [address] ENCOUNTER          PHYSICIAN ASSISTANT NOTE       Date of evaluation: 7/29/2022    Chief Complaint     No chief complaint on file. History of Present Illness     Dav Pope is a 56 y.o. male with a history of bipolar disorder who is well-known to this department and has had frequent visits in the last 4 to 5 months for varying complaints related to mental health who comes into the emergency department today for unclear reasons. The patient reports that he is under the care of [facility] and most recently saw them yesterday. He reports that he feels that they are \"watching him\" and that he would like someone to talk to. He denies any suicidal or homicidal ideation. He reports that he currently has a home that he stays in [facility], but sometimes also sleeps on the streets. He denies any drug or alcohol use. He is alert and oriented x4. He reports that he is currently taking all of his medications. He denies any medical complaints including fevers, cough, shortness of breath, chest pain, abdominal pain, change in bowel or bladder function, nausea or vomiting. I questioned the patient multiple times about his reasons for coming to the emergency department today and he is unable to definitively tell me why he is here other than to [facility] to someone. \"  He does at times tell me however that he does feel that someone is \"watching him. \"  Unclear who this is exactly, but he does prefer to [facility] in his conversations frequently with the statements. He has no other complaints. Review of Systems     Review of Systems   Constitutional:  Negative for chills and fever. Respiratory:  Negative for cough, chest tightness and shortness of breath. Cardiovascular:  Negative for chest pain. Gastrointestinal:  Negative for abdominal pain, diarrhea, nausea and vomiting. Genitourinary:  Negative for dysuria. Neurological:  Negative for dizziness, light-headedness and headaches. Past Medical, Surgical, Family, and Social History     He has a past medical history of Arthritis and Bipolar 1 disorder ([address] He has no past surgical history on file. His family history is not on file. He reports that he has never smoked. He has never used smokeless tobacco. He reports current drug use. Drugs: Marijuana (Weed) and Methamphetamines (Crystal Meth). He reports that he does not drink alcohol. Medications     Discharge Medication List as of 7/29/2022  6:44 PM        CONTINUE these medications which have NOT CHANGED    Details   ARIPiprazole (ABILIFY) 30 MG tablet Take by mouthHistorical Med      benztropine (COGENTIN) 1 MG tablet Take by mouthHistorical Med      buPROPion (WELLBUTRIN XL) 150 MG extended release tablet Take by mouthHistorical Med      famotidine (PEPCID) 20 MG tablet Historical Med      omeprazole (PRILOSEC) 20 MG delayed release capsule Historical Med      paliperidone (INVEGA) 3 MG extended release tablet Historical Med      topiramate (TOPAMAX) 50 MG tablet Take by mouthHistorical Med      polyvinyl alcohol (LIQUIFILM TEARS) 1.4 % ophthalmic solution 1 drop as neededHistorical Med      Skin Protectants, Misc. (EUCERIN) cream Apply topically as needed for Dry Skin Apply topically as needed., Topical, PRN, Historical Med      gabapentin (NEURONTIN) 600 MG tablet Take 600 mg by mouth 3 times daily. Historical Med      Multiple Vitamins-Minerals (THERAPEUTIC MULTIVITAMIN-MINERALS) tablet Take 1 tablet by mouth dailyHistorical Med      oxyCODONE-acetaminophen (PERCOCET) 5-325 MG per tablet Take 1 tablet by mouth every 4 hours as needed for Pain. Historical Med      predniSONE (DELTASONE) 20 MG tablet Take 20 mg by mouth dailyHistorical Med      sennosides-docusate sodium (SENOKOT-S) 8.6-50 MG tablet Take 1 tablet by mouth dailyHistorical Med      divalproex (DEPAKOTE) 125 MG DR tablet Take 125 mg by mouth 3 times dailyHistorical Med             Allergies     He is allergic to acetaminophen and aspirin. Physical Exam     INITIAL VITALS: BP: 116/63, Temp: 98.2 °F (36.8 °C), Heart Rate: 81, Resp: 18, SpO2: 97 %  Physical Exam  Constitutional:       Appearance: Normal appearance. HENT:      Head: Normocephalic and atraumatic. Cardiovascular:      Rate and Rhythm: Normal rate and regular rhythm. Pulses: Normal pulses. Heart sounds: Normal heart sounds. Pulmonary:      Effort: Pulmonary effort is normal.      Breath sounds: Normal breath sounds. Musculoskeletal:         General: Normal range of motion. Cervical back: Normal range of motion. Skin:     General: Skin is warm and dry. Neurological:      Mental Status: He is alert and oriented to person, place, and time. Psychiatric:         Mood and Affect: Mood normal.         Behavior: Behavior normal.         RADIOLOGY:  No orders to display       LABS:   No results found for this visit on 07/29/22. ED BEDSIDE ULTRASOUND:  No results found. RECENT VITALS:  BP: 116/63, Temp: 98.2 °F (36.8 °C), Heart Rate: 81, Resp: 18, SpO2: 97 %     Procedures         ED Course     Nursing Notes, Past Medical Hx,Past Surgical Hx, Social Hx, Allergies, and Family Hx were reviewed. The patient was given the following medications:  No orders of the defined types were placed in this encounter. CONSULTS:  None    MEDICAL DECISION MAKING / ASSESSMENT / Elena Yanez is a 56 y.o. male with a history of bipolar disorder who is well-known to this department and has had frequent visits in the last 4 to 5 months for varying complaints related to mental health who comes into the emergency department today for unclear reasons. The patient reports that he is under the care of [facility] and most recently saw them yesterday. He reports that he feels that they are \"watching him\" and that he would like someone to talk to. He denies any suicidal or homicidal ideation.   He reports that he currently has a home that he stays in [place], but sometimes also sleeps on the streets. He denies any drug or alcohol use. He is alert and oriented x4. He reports that he is currently taking all of his medications. He denies any medical complaints including fevers, cough, shortness of breath, chest pain, abdominal pain, change in bowel or bladder function, nausea or vomiting. I questioned the patient multiple times about his reasons for coming to the emergency department today and he is unable to definitively tell me why he is here other than to [facility] to someone. \"  He does at times tell me however that he does feel that someone is \"watching him. \"  Unclear who this is exactly, but he does prefer to [facility] in his conversations frequently with the statements. He has no other complaints. He is alert and oriented x4. Vital signs are stable. On exam lungs are clear to auscultation bilaterally. No obvious injuries to the patient's face, scalp, extremities or trunk. He is well in appearance. On reassessment the patient reported that he is content following up with [facility] per his previously scheduled appointment and he will do so. He is given return precautions with regard to suicidal or homicidal ideation which he is in agreement with and expresses verbal understanding of. As he has no medical complaints, is alert and oriented x4, is clinically sober, is deemed fit to make his own decisions, I will discharge him with these return precautions. This patient was also evaluated by the attending physician. All care plans were discussed and agreed upon. Clinical Impression     1. Mental and behavioral problem        Disposition     PATIENT REFERRED TO:  No follow-up provider specified.     DISCHARGE MEDICATIONS:  Discharge Medication List as of 7/29/2022  6:44 PM          DISPOSITION Decision To Discharge 07/29/2022 06:38:11 PM          [address] [facility]  07/29/22 29 Enedina Espinosa PA-C  07/29/22 2007

## 2022-08-17 ENCOUNTER — HOSPITAL ENCOUNTER (EMERGENCY)
Age: 57
Discharge: HOME | End: 2022-08-17
Attending: EMERGENCY MEDICINE
Payer: COMMERCIAL

## 2022-08-17 VITALS
OXYGEN SATURATION: 100 % | DIASTOLIC BLOOD PRESSURE: 64 MMHG | SYSTOLIC BLOOD PRESSURE: 112 MMHG | HEIGHT: 70 IN | WEIGHT: 219.9 LBS | BODY MASS INDEX: 31.48 KG/M2 | RESPIRATION RATE: 10 BRPM | TEMPERATURE: 98.6 F | HEART RATE: 66 BPM

## 2022-08-17 DIAGNOSIS — Z76.0 ENCOUNTER FOR MEDICATION REFILL: Primary | ICD-10-CM

## 2022-08-17 PROCEDURE — 99283 EMERGENCY DEPT VISIT LOW MDM: CPT

## 2022-08-17 RX ORDER — TOPIRAMATE 50 MG/1
50 TABLET, FILM COATED ORAL 2 TIMES DAILY
Qty: 60 TABLET | Refills: 0 | Status: SHIPPED | OUTPATIENT
Start: 2022-08-17 | End: 2022-08-17

## 2022-08-17 RX ORDER — PALIPERIDONE 3 MG/1
3 TABLET, EXTENDED RELEASE ORAL EVERY MORNING
Qty: 30 TABLET | Refills: 0 | Status: SHIPPED | OUTPATIENT
Start: 2022-08-17

## 2022-08-17 RX ORDER — BENZTROPINE MESYLATE 1 MG/1
1 TABLET ORAL EVERY MORNING
Qty: 60 TABLET | Refills: 0 | Status: SHIPPED | OUTPATIENT
Start: 2022-08-17

## 2022-08-17 RX ORDER — OMEPRAZOLE 20 MG/1
20 CAPSULE, DELAYED RELEASE ORAL DAILY
Qty: 30 CAPSULE | Refills: 2 | Status: SHIPPED | OUTPATIENT
Start: 2022-08-17 | End: 2022-08-17

## 2022-08-17 RX ORDER — TOPIRAMATE 50 MG/1
50 TABLET, FILM COATED ORAL 2 TIMES DAILY
Qty: 60 TABLET | Refills: 0 | Status: SHIPPED | OUTPATIENT
Start: 2022-08-17

## 2022-08-17 RX ORDER — PERMETHRIN 50 MG/G
CREAM TOPICAL ONCE
COMMUNITY

## 2022-08-17 RX ORDER — OMEPRAZOLE 20 MG/1
20 CAPSULE, DELAYED RELEASE ORAL DAILY
Qty: 30 CAPSULE | Refills: 2 | Status: SHIPPED | OUTPATIENT
Start: 2022-08-17

## 2022-08-17 ASSESSMENT — PAIN - FUNCTIONAL ASSESSMENT: PAIN_FUNCTIONAL_ASSESSMENT: NONE - DENIES PAIN

## 2022-08-17 NOTE — ED PROVIDER NOTES
[address] [facility]    CHIEF COMPLAINT  Medication Refill (Reports homeless needs phych meds filled )       HISTORY OF PRESENT ILLNESS  Dm Guan is a 56 y.o. male who presents to the ED for medication refill. Follows with [facility] for psychiatric care. States that he followed with [facility] on the first of the month and had his medications refilled but unfortunately he needed to leave them behind at a previous residence and no longer has access to them. He is otherwise without complaint and specifically denies suicidal ideation, homicidal ideation, hallucinations, chest pain, shortness of breath, nausea, vomiting, diarrhea, abdominal pain. He is not sure which medications he takes. No other complaints, modifying factors or associated symptoms. I have reviewed the following from the nursing documentation:    Past Medical History:   Diagnosis Date    Arthritis     Bipolar 1 disorder ([facility] [address]      No past surgical history on file. No family history on file.   Social History     Socioeconomic History    Marital status: Single     Spouse name: Not on file    Number of children: Not on file    Years of education: Not on file    Highest education level: Not on file   Occupational History    Not on file   Tobacco Use    Smoking status: Never    Smokeless tobacco: Never   Substance and Sexual Activity    Alcohol use: Never    Drug use: Yes     Types: Marijuana (Weed), Methamphetamines (Crystal Meth)     Comment: used meth \"couple of months ago\"    Sexual activity: Not on file   Other Topics Concern    Not on file   Social History Narrative    Not on file     Social Determinants of Health     Financial Resource Strain: Not on file   Food Insecurity: Not on file   Transportation Needs: Not on file   Physical Activity: Not on file   Stress: Not on file   Social Connections: Not on file   Intimate Partner Violence: Not on file   Housing Stability: Not on file     No current facility-administered medications for this encounter. Current Outpatient Medications   Medication Sig Dispense Refill    permethrin (ELIMITE) 5 % cream Apply topically once Apply topically as directed      vitamin D (CHOLECALCIFEROL) 125 MCG (5000 UT) CAPS capsule Take 5,000 Units by mouth daily      paliperidone (INVEGA) 3 MG extended release tablet Take 1 tablet by mouth every morning 30 tablet 0    benztropine (COGENTIN) 1 MG tablet Take 1 tablet by mouth every morning 60 tablet 0    omeprazole (PRILOSEC) 20 MG delayed release capsule Take 1 capsule by mouth Daily 30 capsule 2    topiramate (TOPAMAX) 50 MG tablet Take 1 tablet by mouth 2 times daily 60 tablet 0    ARIPiprazole (ABILIFY) 30 MG tablet Take by mouth      buPROPion (WELLBUTRIN XL) 150 MG extended release tablet Take by mouth      famotidine (PEPCID) 20 MG tablet       polyvinyl alcohol (LIQUIFILM TEARS) 1.4 % ophthalmic solution 1 drop as needed      Skin Protectants, Misc. (EUCERIN) cream Apply topically as needed for Dry Skin Apply topically as needed. gabapentin (NEURONTIN) 600 MG tablet Take 600 mg by mouth 3 times daily. Multiple Vitamins-Minerals (THERAPEUTIC MULTIVITAMIN-MINERALS) tablet Take 1 tablet by mouth daily      oxyCODONE-acetaminophen (PERCOCET) 5-325 MG per tablet Take 1 tablet by mouth every 4 hours as needed for Pain. predniSONE (DELTASONE) 20 MG tablet Take 20 mg by mouth daily      sennosides-docusate sodium (SENOKOT-S) 8.6-50 MG tablet Take 1 tablet by mouth daily      divalproex (DEPAKOTE) 125 MG DR tablet Take 125 mg by mouth 3 times daily       Allergies   Allergen Reactions    Acetaminophen      Liver dr advised to never take; GI bleed    Aspirin      GI bleed and Liver Dr told him to never take it       REVIEW OF SYSTEMS  10 systems reviewed, pertinent positives and negatives per HPI, otherwise noted to be negative.     PHYSICAL EXAM  ED Triage Vitals [08/17/22 1453]   BP Temp Temp Source Heart Rate Resp SpO2 Height Weight   112/64 98.6 °F (37 °C) Oral 66 10 100 % 5' 10\" (1.778 m) 219 lb 14.4 oz (99.7 kg)     General appearance: Awake and alert. Cooperative. No acute distress. HENT: Normocephalic. Atraumatic. Mucous membranes are moist.  Eyes: PERRL. EOMI. Heart/Chest: RRR. No murmurs. Lungs: Respirations unlabored. CTAB. Good air exchange. Speaking comfortably in full sentences. Abdomen: Soft. Non-tender. Non-distended. No rebound or guarding. Musculoskeletal: No extremity edema. No deformity. No tenderness in the extremities. All extremities neurovascularly intact. Skin: Warm and dry. No acute rashes. Neurological: Alert and oriented. Strength 5/5 bilateral upper and lower extremities. Sensation intact to light touch. Gait normal.  Psychiatric: Mood/affect: normal      LABS  I have reviewed all labs for this visit. No results found for this visit on 08/17/22. RADIOLOGY  I have reviewed all radiographic studies for this visit. No orders to display      ED COURSE/MDM  Patient seen and evaluated. Old records reviewed. Labs and imaging reviewed and results discussed with patient/family to extent possible. Patient presents for medication refill of his psychiatric medications. He is otherwise asymptomatic. He is not sure which medications he is on. I called [facility] who was able to fax his medication list to the emergency department. We will refill benztropine, omeprazole, Invega, and Topamax. He is not with any acute psychiatric complaints and does not require admission at this time. We will have patient follow with [facility] in the outpatient setting. Advised usual strict return precautions for new or worsening symptoms. Is this patient to be included in the SEP-1 Core Measure? No   Exclusion criteria - the patient is NOT to be included for SEP-1 Core Measure due to: Infection is not suspected    IVicky MD, am the primary clinician of record.      All questions were answered and the patient/family expressed understanding and agreement with the plan. PROCEDURES  None    CRITICAL CARE  N/A    CLINICAL IMPRESSION  1. Encounter for medication refill        DISPOSITION   discharge     Carlos Guzman MD    Note: This chart was created using voice recognition dictation software. Efforts were made by me to ensure accuracy, however some errors may be present due to limitations of this technology and occasionally words are not transcribed correctly.        Carlos Guzman MD  08/17/22 9756

## 2022-08-17 NOTE — DISCHARGE INSTRUCTIONS
You were seen in the Emergency Department for medication refill. Return to the Emergency Department if you develop any new or worsening symptoms, chest pain, shortness of breath, thoughts of wanting to harm yourself or others, hallucinations, or for any other concerns.

## 2022-08-27 ENCOUNTER — HOSPITAL ENCOUNTER (EMERGENCY)
Age: 57
Discharge: HOME | End: 2022-08-27
Attending: EMERGENCY MEDICINE
Payer: COMMERCIAL

## 2022-08-27 VITALS
DIASTOLIC BLOOD PRESSURE: 73 MMHG | SYSTOLIC BLOOD PRESSURE: 115 MMHG | OXYGEN SATURATION: 100 % | HEART RATE: 81 BPM | WEIGHT: 220.6 LBS | TEMPERATURE: 97.9 F | RESPIRATION RATE: 20 BRPM | BODY MASS INDEX: 31.65 KG/M2

## 2022-08-27 DIAGNOSIS — T16.2XXS FOREIGN BODY OF LEFT EAR, SEQUELA: Primary | ICD-10-CM

## 2022-08-27 PROCEDURE — 99282 EMERGENCY DEPT VISIT SF MDM: CPT

## 2022-08-27 ASSESSMENT — ENCOUNTER SYMPTOMS
ABDOMINAL PAIN: 0
PHOTOPHOBIA: 0
WHEEZING: 0
NAUSEA: 0
BACK PAIN: 0
DIARRHEA: 0
COUGH: 0
RHINORRHEA: 0
VOMITING: 0
SHORTNESS OF BREATH: 0

## 2022-08-27 ASSESSMENT — PAIN - FUNCTIONAL ASSESSMENT: PAIN_FUNCTIONAL_ASSESSMENT: NONE - DENIES PAIN

## 2022-08-27 NOTE — ED PROVIDER NOTES
Emergency Department Provider Note  Location: [address] [place] [address]  8/27/2022     Patient Identification  Simón Begum is a 56 y.o. male    Chief Complaint  Ear Problem (Insect L ear per pt. this AM.)          HPI  (History provided by patient)  Patient is a 53-year-old male who presents with chief complaint of insect in his left ear. Patient reports he woke up and he felt insect inside his left ear. He is unsure if it still there. Denies any ear pain discharge and hearing changes. No other sinus symptoms reported. No longer has a sensation. No exacerbating alleviating factors. I have reviewed the following nursing documentation:  Allergies: Allergies   Allergen Reactions    Acetaminophen      Liver dr advised to never take; GI bleed    Aspirin      GI bleed and Liver Dr told him to never take it       Past medical history:  has a past medical history of Arthritis and Bipolar 1 disorder ([facility] [address] Past surgical history:  has no past surgical history on file. Home medications:   Prior to Admission medications    Medication Sig Start Date End Date Taking?  Authorizing Provider   OMEPRAZOLE PO Take by mouth   Yes Historical Provider, MD   PALIPERIDONE ER PO Take by mouth   Yes Historical Provider, MD   permethrin (ELIMITE) 5 % cream Apply topically once Apply topically as directed    Historical Provider, MD   vitamin D (CHOLECALCIFEROL) 125 MCG (5000 UT) CAPS capsule Take 5,000 Units by mouth daily    Historical Provider, MD   paliperidone (INVEGA) 3 MG extended release tablet Take 1 tablet by mouth every morning 8/17/22   Carmelo Sacks, MD   benztropine (COGENTIN) 1 MG tablet Take 1 tablet by mouth every morning 8/17/22   Carmelo Sacks, MD   omeprazole (PRILOSEC) 20 MG delayed release capsule Take 1 capsule by mouth Daily 8/17/22   Carmelo Sacks, MD   topiramate (TOPAMAX) 50 MG tablet Take 1 tablet by mouth 2 times daily 8/17/22   Carmelo Sacks, MD   ARIPiprazole (ABILIFY) 30 MG tablet Take by mouth    Historical Provider, MD   buPROPion (WELLBUTRIN XL) 150 MG extended release tablet Take by mouth    Historical Provider, MD   famotidine (PEPCID) 20 MG tablet  5/18/22   Historical Provider, MD   polyvinyl alcohol (LIQUIFILM TEARS) 1.4 % ophthalmic solution 1 drop as needed    Historical Provider, MD   Skin Protectants, Misc. (EUCERIN) cream Apply topically as needed for Dry Skin Apply topically as needed. Historical Provider, MD   gabapentin (NEURONTIN) 600 MG tablet Take 600 mg by mouth 3 times daily. Historical Provider, MD   Multiple Vitamins-Minerals (THERAPEUTIC MULTIVITAMIN-MINERALS) tablet Take 1 tablet by mouth daily    Historical Provider, MD   oxyCODONE-acetaminophen (PERCOCET) 5-325 MG per tablet Take 1 tablet by mouth every 4 hours as needed for Pain. Historical Provider, MD   predniSONE (DELTASONE) 20 MG tablet Take 20 mg by mouth daily    Historical Provider, MD   sennosides-docusate sodium (SENOKOT-S) 8.6-50 MG tablet Take 1 tablet by mouth daily    Historical Provider, MD   divalproex (DEPAKOTE) 125 MG DR tablet Take 125 mg by mouth 3 times daily    Historical Provider, MD       Social history:  reports that he has never smoked. He has never used smokeless tobacco. He reports current drug use. Drugs: Marijuana (Weed) and Methamphetamines (Crystal Meth). He reports that he does not drink alcohol. Family history:  History reviewed. No pertinent family history. ROS  Review of Systems   Constitutional:  Negative for chills and fever. HENT:  Negative for congestion and rhinorrhea. Eyes:  Negative for photophobia and visual disturbance. Respiratory:  Negative for cough, shortness of breath and wheezing. Cardiovascular:  Negative for chest pain and palpitations. Gastrointestinal:  Negative for abdominal pain, diarrhea, nausea and vomiting. Genitourinary:  Negative for dysuria and hematuria. Musculoskeletal:  Negative for back pain and neck pain. Skin:  Negative for rash and wound. Neurological:  Negative for syncope and weakness. Psychiatric/Behavioral:  Negative for agitation and confusion. Exam  ED Triage Vitals   BP Temp Temp src Pulse Resp SpO2 Height Weight   -- -- -- -- -- -- -- --       Physical Exam  Vitals and nursing note reviewed. Constitutional:       General: He is not in acute distress. Appearance: He is well-developed. HENT:      Head: Normocephalic and atraumatic. Right Ear: Tympanic membrane, ear canal and external ear normal.      Left Ear: Tympanic membrane, ear canal and external ear normal.      Nose: Nose normal. No congestion. Eyes:      General: No scleral icterus. Extraocular Movements: Extraocular movements intact. Cardiovascular:      Rate and Rhythm: Normal rate and regular rhythm. Heart sounds: No murmur heard. Pulmonary:      Effort: Pulmonary effort is normal.      Breath sounds: Normal breath sounds. Musculoskeletal:         General: No deformity. Normal range of motion. Cervical back: Normal range of motion and neck supple. Skin:     General: Skin is warm. Findings: No rash. Neurological:      Mental Status: He is alert and oriented to person, place, and time. Motor: No abnormal muscle tone. Coordination: Coordination normal.   Psychiatric:         Mood and Affect: Mood normal.         Behavior: Behavior normal.         ED Course    ED Medication Orders (From admission, onward)      None              Radiology  No results found. Labs  No results found for this visit on 08/27/22. Procedures  Procedures      MDM  Patient seen and evaluated. Relevant records reviewed. - Patient is 56 y.o. male presented for foreign body sensation concern for insect in the left ear. - Exam showed no evidence of insect or other foreign body.   Normal physical exam  - I have a low concern for  other emergent process, and do not see indication for further work-up in the ER, as it is unlikely  and poses more risk than benefit. - I discussed the results, including any incidental findings, with patient. Questions answered. We agreed to discharge. Patient/family agreeable to plan and express understanding of plan. Clinical Impression:  1. Foreign body of left ear, sequela          Disposition:  Discharge to home in good condition. Blood pressure 115/73, pulse 81, temperature 97.9 °F (36.6 °C), temperature source Oral, resp. rate 20, weight 220 lb 9.6 oz (100.1 kg), SpO2 100 %. Patient was given scripts for the following medications. I counseled patient how to take these medications. New Prescriptions    No medications on file       Disposition referral (if applicable):  Chelsey Burnett MD  [address] [place] [address]  [phone]    Schedule an appointment as soon as possible for a visit   As needed    I, Mariela Li, am the primary attending of record and contributed the majority of evaluation and treatment of emergent care for this encounter. Total critical care time is 0 minutes, which excludes separately billable procedures and updating family. Time spent is specifically for management of the presenting complaint and symptoms initially, direct bedside care, reevaluation, review of records, and consultation. There was a high probability of clinically significant life-threatening deterioration in the patient's condition, which required my urgent intervention. This chart was generated in part by using Dragon Dictation system and may contain errors related to that system including errors in grammar, punctuation, and spelling, as well as words and phrases that may be inappropriate. If there are any questions or concerns please feel free to contact the dictating provider for clarification.      MD Yarely Crook MD  08/27/22 3462

## 2023-10-13 NOTE — DISCHARGE INSTRUCTIONS
Are you currently on 100 mg daily ?  Can increase to 150 mg daily, follow up if symptoms do not improve     Follow-up with [facility] per your previously scheduled appointment  Return to the emergency department sooner with new or worsening symptoms including thoughts of wanting to harm yourself or others, medical complaints, confusion, injuries or other concerning symptoms.